# Patient Record
Sex: FEMALE | Race: WHITE | NOT HISPANIC OR LATINO | Employment: OTHER | URBAN - METROPOLITAN AREA
[De-identification: names, ages, dates, MRNs, and addresses within clinical notes are randomized per-mention and may not be internally consistent; named-entity substitution may affect disease eponyms.]

---

## 2017-03-31 ENCOUNTER — OFFICE VISIT (OUTPATIENT)
Dept: OBSTETRICS AND GYNECOLOGY | Facility: CLINIC | Age: 77
End: 2017-03-31
Payer: MEDICARE

## 2017-03-31 VITALS
HEIGHT: 68 IN | BODY MASS INDEX: 19.12 KG/M2 | DIASTOLIC BLOOD PRESSURE: 75 MMHG | HEART RATE: 71 BPM | WEIGHT: 126.13 LBS | SYSTOLIC BLOOD PRESSURE: 132 MMHG

## 2017-03-31 DIAGNOSIS — Z12.31 ENCOUNTER FOR SCREENING MAMMOGRAM FOR BREAST CANCER: ICD-10-CM

## 2017-03-31 DIAGNOSIS — Z01.419 WELL WOMAN EXAM WITH ROUTINE GYNECOLOGICAL EXAM: Primary | ICD-10-CM

## 2017-03-31 PROCEDURE — G0101 CA SCREEN;PELVIC/BREAST EXAM: HCPCS | Mod: S$GLB,,, | Performed by: OBSTETRICS & GYNECOLOGY

## 2017-03-31 PROCEDURE — 99999 PR PBB SHADOW E&M-EST. PATIENT-LVL III: CPT | Mod: PBBFAC,,, | Performed by: OBSTETRICS & GYNECOLOGY

## 2017-03-31 NOTE — PROGRESS NOTES
SUBJECTIVE:   76 y.o. female   for annual routine Pap and checkup. No LMP recorded. Patient is postmenopausal..  She has no unusual complaints.        Past Medical History:   Diagnosis Date    Anemia     Hypercholesteremia     Osteoporosis, postmenopausal     Poliomyelitis     as child    Vitamin D deficiency disease      Past Surgical History:   Procedure Laterality Date    IUD removed      TONSILLECTOMY      TONSILLECTOMY, ADENOIDECTOMY       Social History     Social History    Marital status: Single     Spouse name: N/A    Number of children: 2    Years of education: N/A     Occupational History    Not on file.     Social History Main Topics    Smoking status: Never Smoker    Smokeless tobacco: Never Used    Alcohol use No    Drug use: No    Sexual activity: Not on file     Other Topics Concern    Not on file     Social History Narrative    Exercises regularly.    Rates diet as fair.    Satisfied with weight.     Family History   Problem Relation Age of Onset    Stroke Mother     Heart failure Father     Rheum arthritis Father     Cancer Maternal Grandmother     Stroke Maternal Grandfather     Glaucoma Daughter     Stroke Maternal Aunt     Cancer Brother     Amblyopia Neg Hx     Blindness Neg Hx     Cataracts Neg Hx     Diabetes Neg Hx     Hypertension Neg Hx     Macular degeneration Neg Hx     Retinal detachment Neg Hx     Strabismus Neg Hx     Thyroid disease Neg Hx     Breast cancer Neg Hx     Ovarian cancer Neg Hx      OB History    Para Term  AB SAB TAB Ectopic Multiple Living   2 2 2 0 0 0 0 0 0 2      # Outcome Date GA Lbr Chong/2nd Weight Sex Delivery Anes PTL Lv   2 Term      Vag-Spont  N Y   1 Term      Vag-Spont  N Y      Obstetric Comments   Menarche age 17.         Current Outpatient Prescriptions   Medication Sig Dispense Refill    calcium-vitamin D (CALCIUM WITH VITAMIN D) 600 mg(1,500mg) -400 unit Tab Take 1 tablet by mouth once daily.      "   cyanocobalamin 2000 MCG tablet Take 2,000 mcg by mouth once daily.      ferrous sulfate 325 (65 FE) MG EC tablet Take 325 mg by mouth 3 (three) times daily with meals.      fish oil-omega-3 fatty acids 300-1,000 mg capsule Take 2 g by mouth once daily.      multivitamin capsule Take 1 capsule by mouth once daily.       No current facility-administered medications for this visit.      Allergies: Corticosteroids (glucocorticoids) and Prednisone     ROS:  Constitutional: no weight loss, weight gain, fever, fatigue  Eyes:  No vision changes, glasses/contacts  ENT/Mouth: No ulcers, sinus problems, ears ringing, headache  Cardiovascular: No inability to lie flat, chest pain, exercise intolerance, swelling, heart palpitations  Respiratory: No wheezing, coughing blood, shortness of breath, or cough  Gastrointestinal: No diarrhea, bloody stool, nausea/vomiting, constipation, gas, hemorrhoids  Genitourinary: No blood in urine, painful urination, urgency of urination, frequency of urination, incomplete emptying, incontinence, abnormal bleeding, painful periods, heavy periods, vaginal discharge, vaginal odor, painful intercourse, sexual problems, bleeding after intercourse.  Musculoskeletal: No muscle weakness  Skin/Breast: No painful breasts, nipple discharge, masses, rash, ulcers  Neurological: No passing out, seizures, numbness, headache  Endocrine: No diabetes, hypothyroid, hyperthyroid, hot flashes, hair loss, abnormal hair growth, ance  Psychiatric: No depression, crying  Hematologic: No bruises, bleeding, swollen lymph nodes, anemia.      OBJECTIVE:   The patient appears well, alert, oriented x 3, in no distress.  /75  Pulse 71  Ht 5' 8" (1.727 m)  Wt 57.2 kg (126 lb 1.7 oz)  BMI 19.17 kg/m2  NECK: no thyromegaly, trachea midline  SKIN: no acne, striae, hirsutism  BREAST EXAM: breasts appear normal, no suspicious masses, no skin or nipple changes or axillary nodes  ABDOMEN: no hernias, masses, or " hepatosplenomegaly  GENITALIA: normal external genitalia, no erythema, no discharge  URETHRA: normal urethra, normal urethral meatus  VAGINA: cystocele  CERVIX: no lesions or cervical motion tenderness  UTERUS: normal size, contour, position, consistency, mobility, non-tender  ADNEXA: normal adnexa and no mass, fullness, tenderness      ASSESSMENT:   well woman    PLAN:   mammogram  pap smear  counseled on breast self exam, mammography screening and adequate intake of calcium and vitamin D  return annually or prn

## 2017-04-03 ENCOUNTER — TELEPHONE (OUTPATIENT)
Dept: OBSTETRICS AND GYNECOLOGY | Facility: CLINIC | Age: 77
End: 2017-04-03

## 2017-04-03 NOTE — TELEPHONE ENCOUNTER
----- Message from Mayra Garsia sent at 3/31/2017  4:43 PM CDT -----  Contact: self  Patient called regarding scheduling an appt for f/u from today. Not sure what the appt is for and advise someone at the office she would call back to schedule once she is at home by her calendar. Please contact [phone] (home)

## 2017-04-05 NOTE — TELEPHONE ENCOUNTER
Could not reach by phone, patient had scheduled another appointment to get a pap, when she was told that she did not need a pap after age 65.

## 2017-05-01 ENCOUNTER — TELEPHONE (OUTPATIENT)
Dept: OBSTETRICS AND GYNECOLOGY | Facility: CLINIC | Age: 77
End: 2017-05-01

## 2017-05-01 NOTE — TELEPHONE ENCOUNTER
----- Message from Yoon Valdes sent at 4/29/2017  2:46 PM CDT -----  Contact: pt  Pt called and states she wants to reschedule her appt to another day and time due to her daughter is here from out of town. Pt would like for someone to call her in regards to rescheduling her appt. Pt can be reached at [phone].pt would like to come in before may 10,2017 due to she is having eye surgery on may 10,2017.

## 2017-05-01 NOTE — TELEPHONE ENCOUNTER
Called and spoke to pt and informed her that her chances for  Cervical  Cancer is greatly reduced that it is not required to get a pap after age 65 years. Pt is insisting on getting the pap, rescheduled appt

## 2017-06-27 ENCOUNTER — OFFICE VISIT (OUTPATIENT)
Dept: OBSTETRICS AND GYNECOLOGY | Facility: CLINIC | Age: 77
End: 2017-06-27
Payer: MEDICARE

## 2017-06-27 VITALS
SYSTOLIC BLOOD PRESSURE: 156 MMHG | WEIGHT: 123.56 LBS | HEART RATE: 64 BPM | BODY MASS INDEX: 18.73 KG/M2 | DIASTOLIC BLOOD PRESSURE: 83 MMHG | HEIGHT: 68 IN

## 2017-06-27 DIAGNOSIS — Z01.419 WELL WOMAN EXAM WITH ROUTINE GYNECOLOGICAL EXAM: Primary | ICD-10-CM

## 2017-06-27 PROCEDURE — 99999 PR PBB SHADOW E&M-EST. PATIENT-LVL III: CPT | Mod: PBBFAC,,, | Performed by: OBSTETRICS & GYNECOLOGY

## 2017-06-27 PROCEDURE — G0101 CA SCREEN;PELVIC/BREAST EXAM: HCPCS | Mod: S$GLB,,, | Performed by: OBSTETRICS & GYNECOLOGY

## 2017-06-27 PROCEDURE — 88142 CYTOPATH C/V THIN LAYER: CPT

## 2017-07-09 NOTE — PROGRESS NOTES
Subjective:       Patient ID: Joan Josue is a 76 y.o. female.    Chief Complaint:  No chief complaint on file.      History of Present Illness  HPI  Pt without complaints, here for pap.  Pt has been counselled on the fact she does not need a pap after the age of 66 but still desires one.    GYN & OB History  No LMP recorded. Patient is postmenopausal.   Date of Last Pap: 2017    OB History    Para Term  AB Living   2 2 2 0 0 2   SAB TAB Ectopic Multiple Live Births   0 0 0 0 2      # Outcome Date GA Lbr Chong/2nd Weight Sex Delivery Anes PTL Lv   2 Term      Vag-Spont  N CIRO   1 Term      Vag-Spont  N CIRO      Obstetric Comments   Menarche age 17.       Review of Systems  Review of Systems   Constitutional: Negative.    Respiratory: Negative.    Cardiovascular: Negative.    Gastrointestinal: Negative.    Genitourinary: Negative.    Musculoskeletal: Negative.    Skin:  Negative.   Neurological: Negative.    Psychiatric/Behavioral: Negative.            Objective:    Physical Exam:   Constitutional: She is oriented to person, place, and time. She appears well-developed and well-nourished.    HENT:   Head: Normocephalic and atraumatic.    Eyes: EOM are normal.    Neck: Normal range of motion.    Cardiovascular: Normal rate.     Pulmonary/Chest: Effort normal.          Genitourinary: Vagina normal. There is no rash, tenderness, lesion or injury on the right labia. There is no rash, tenderness, lesion or injury on the left labia. Cervix is normal.           Musculoskeletal: Normal range of motion and moves all extremeties.       Neurological: She is alert and oriented to person, place, and time.    Skin: Skin is warm and dry.    Psychiatric: She has a normal mood and affect. Her behavior is normal. Judgment and thought content normal.          Assessment:        1. Well woman exam with routine gynecological exam                Plan:      Pap done at patient request

## 2018-02-19 PROBLEM — D51.9 VITAMIN B12 DEFICIENCY ANEMIA: Status: ACTIVE | Noted: 2018-02-19

## 2018-05-14 ENCOUNTER — OFFICE VISIT (OUTPATIENT)
Dept: HEMATOLOGY/ONCOLOGY | Facility: CLINIC | Age: 78
End: 2018-05-14
Payer: MEDICARE

## 2018-05-14 VITALS
TEMPERATURE: 99 F | SYSTOLIC BLOOD PRESSURE: 150 MMHG | DIASTOLIC BLOOD PRESSURE: 88 MMHG | BODY MASS INDEX: 19.37 KG/M2 | HEART RATE: 80 BPM | RESPIRATION RATE: 18 BRPM | WEIGHT: 127.38 LBS

## 2018-05-14 DIAGNOSIS — D64.89 ANEMIA DUE TO MULTIPLE MECHANISMS: ICD-10-CM

## 2018-05-14 DIAGNOSIS — D64.9 ANEMIA, UNSPECIFIED TYPE: ICD-10-CM

## 2018-05-14 DIAGNOSIS — D51.8 OTHER VITAMIN B12 DEFICIENCY ANEMIA: Primary | ICD-10-CM

## 2018-05-14 DIAGNOSIS — E55.9 VITAMIN D DEFICIENCY DISEASE: ICD-10-CM

## 2018-05-14 DIAGNOSIS — D50.8 OTHER IRON DEFICIENCY ANEMIA: ICD-10-CM

## 2018-05-14 PROBLEM — D50.9 IRON DEFICIENCY ANEMIA: Status: ACTIVE | Noted: 2018-05-14

## 2018-05-14 PROCEDURE — 99213 OFFICE O/P EST LOW 20 MIN: CPT | Mod: ,,, | Performed by: INTERNAL MEDICINE

## 2018-05-14 NOTE — LETTER
May 14, 2018      Donald Andre MD  [address] [place] [address] [address]  [facility] [address]           [facility]ology [facility]  [address]  [address]  [facility] [address]  Phone: [phone]  Fax: [phone]          Patient: Joan Josue   MR Number: [id number]   YOB: 1940   Date of Visit: 5/14/2018       Dear Dr. Donald Andre:    Thank you for referring Joan Josue to me for evaluation. Attached you will find relevant portions of my assessment and plan of care.    If you have questions, please do not hesitate to call me. I look forward to following Joan Josue along with you.    Sincerely,    Peter Paula MD    Enclosure  CC:  No Recipients    If you would like to receive this communication electronically, please contact [email or web address] or [phone] to request more information on [employer] access.    For providers and/or their staff who would like to refer a patient to [facility], please contact us through our one-stop-shop provider referral line, [facility], at [phone].    If you feel you have received this communication in error or would no longer like to receive these types of communications, please e-mail [email or web address]

## 2018-05-14 NOTE — PROGRESS NOTES
"   [facility] Hematology/Oncology  PROGRESS NOTE      Subjective:       Patient ID:   NAME: Joan Josue : 1940     77 y.o. female    Referring Doc: Thai  Other Physicians:    Chief Complaint:  Anemia f/u    History of Present Illness:     Patient returns today for a regularly scheduled follow-up visit.  The patient was last seen on 2016. She is here by herself. She is doing ok with no new issues. She denies any CP, SOB, HA's or N/V. She has been noncompliant with the B12 supplements. She says she has been taking "natural" iron supplements. She feels "good"; she plans to move to [place] soon.             ROS:   GEN: normal without any fever, night sweats or weight loss  HEENT: normal with no HA's, sore throat, stiff neck, changes in vision  CV: normal with no CP, SOB, PND, MACKEY or orthopnea  PULM: normal with no SOB, cough, hemoptysis, sputum or pleuritic pain  GI: normal with no abdominal pain, nausea, vomiting, constipation, diarrhea, melanotic stools, BRBPR, or hematemesis  : normal with no hematuria, dysuria  BREAST: normal with no mass, discharge, pain  SKIN: normal with no rash, erythema, bruising, or swelling    Allergies:  Review of patient's allergies indicates:   Allergen Reactions    Corticosteroids (glucocorticoids)      Tachycardia      Prednisone      Other reaction(s): tachycardia  Other reaction(s): muscle spasm       Medications:    Current Outpatient Prescriptions:     calcium-vitamin D (CALCIUM WITH VITAMIN D) 600 mg(1,500mg) -400 unit Tab, Take 1 tablet by mouth once daily.  , Disp: , Rfl:     cyanocobalamin 2000 MCG tablet, Take 2,000 mcg by mouth once daily., Disp: , Rfl:     ferrous sulfate 325 (65 FE) MG EC tablet, Take 325 mg by mouth 3 (three) times daily with meals., Disp: , Rfl:     fish oil-omega-3 fatty acids 300-1,000 mg capsule, Take 2 g by mouth once daily., Disp: , Rfl:     multivitamin capsule, Take 1 capsule by mouth once daily., Disp: , Rfl:     PMHx/PSHx " Updates:  See patient's last visit with me on 5/30/2106.  See H&P on 5/22/2014        Pathology:  none          Objective:     Vitals:  Blood pressure (!) 150/88, pulse 80, temperature 98.5 °F (36.9 °C), resp. rate 18, weight 57.8 kg (127 lb 6.4 oz).    Physical Examination:   GEN: no apparent distress, comfortable; AAOx3  HEAD: atraumatic and normocephalic  EYES: no pallor, no icterus, PERRLA  ENT: OMM, no pharyngeal erythema, external ears WNL; no nasal discharge; no thrush  NECK: no masses, thyroid normal, trachea midline, no LAD/LN's, supple  CV: RRR with no murmur; normal pulse; normal S1 and S2; no pedal edema  CHEST: Normal respiratory effort; CTAB; normal breath sounds; no wheeze or crackles  ABDOM: nontender and nondistended; soft; normal bowel sounds; no rebound/guarding  MUSC/Skeletal: ROM normal; no crepitus; joints normal; no deformities or arthropathy  EXTREM: no clubbing, cyanosis, inflammation or swelling  SKIN: no rashes, lesions, ulcers, petechiae or subcutaneous nodules  : no meneses  NEURO: grossly intact; motor/sensory WNL; AAOx3; no tremors  PSYCH: normal mood, affect and behavior  LYMPH: normal cervical, supraclavicular, axillary and groin LN's            Labs:     4/23/2018  Lab Results   Component Value Date    WBC 5.90 04/23/2015    HGB 11.3 (L) 04/23/2015    HCT 33.7 (L) 04/23/2015    MCV 98 04/23/2015     04/23/2015     BMP  Lab Results   Component Value Date     (L) 04/23/2015    K 3.6 04/23/2015     04/23/2015    CO2 25 04/23/2015    BUN 9 04/23/2015    CREATININE 0.6 04/23/2015    CALCIUM 9.5 04/23/2015    ANIONGAP 8 04/23/2015    ESTGFRAFRICA >60 04/23/2015    EGFRNONAA >60 04/23/2015     Lab Results   Component Value Date    ALT 13 04/23/2015    AST 19 04/23/2015    ALKPHOS 72 04/23/2015    BILITOT 0.3 04/23/2015           Radiology/Diagnostic Studies:    No results found.    I have reviewed all available lab results and radiology reports.    Assessment/Plan:   (1)  77 y.o. female with diagnosis of multifactorial anemia with underlying anemia of iron deficiency and B12 deficiency  - s/p IV iron in past (Aug-Sept 2015)  - previously on B12 monthly  - latest hgb at 11.3  - no up to date B12 or iron labs       (2) Persistent noncompliance issues which hinders my ability to provide her with hematologic care        PLAN:  1. Check up to date labs incl. Iron and B12  2. Encouraged compliance  3. F/u with PCP  4. RTC in 6 months    Fax note to Donald Andre MD,     Discussion:     I have explained all of the above in detail and the patient understands all of the current recommendation(s). I have answered all of their questions to the best of my ability and to their complete satisfaction.   The patient is to continue with the current management plan.            Electronically signed by Peter Paula MD

## 2018-05-15 ENCOUNTER — OFFICE VISIT (OUTPATIENT)
Dept: UROGYNECOLOGY | Facility: CLINIC | Age: 78
End: 2018-05-15
Payer: MEDICARE

## 2018-05-15 VITALS
SYSTOLIC BLOOD PRESSURE: 136 MMHG | BODY MASS INDEX: 19.12 KG/M2 | DIASTOLIC BLOOD PRESSURE: 74 MMHG | HEART RATE: 68 BPM | HEIGHT: 68 IN | WEIGHT: 126.13 LBS

## 2018-05-15 DIAGNOSIS — K46.9 ENTEROCELE: ICD-10-CM

## 2018-05-15 DIAGNOSIS — N81.11 CYSTOCELE, MIDLINE: Primary | ICD-10-CM

## 2018-05-15 DIAGNOSIS — N39.8 VOIDING DYSFUNCTION: ICD-10-CM

## 2018-05-15 DIAGNOSIS — N81.89 PELVIC RELAXATION: ICD-10-CM

## 2018-05-15 DIAGNOSIS — N95.2 ATROPHIC VAGINITIS: ICD-10-CM

## 2018-05-15 PROCEDURE — 99214 OFFICE O/P EST MOD 30 MIN: CPT | Mod: 25,S$GLB,, | Performed by: NURSE PRACTITIONER

## 2018-05-15 PROCEDURE — 57160 INSERT PESSARY/OTHER DEVICE: CPT | Mod: S$GLB,,, | Performed by: NURSE PRACTITIONER

## 2018-05-15 PROCEDURE — 99999 PR PBB SHADOW E&M-EST. PATIENT-LVL III: CPT | Mod: PBBFAC,,, | Performed by: NURSE PRACTITIONER

## 2018-05-15 RX ORDER — ESTRADIOL 0.1 MG/G
CREAM VAGINAL
Qty: 42.5 G | Refills: 3 | Status: SHIPPED | OUTPATIENT
Start: 2018-05-15

## 2018-05-15 NOTE — PROGRESS NOTES
Subjective:       Patient ID: Joan Josue is a 77 y.o. female.    Chief Complaint: Vaginal Prolapse    HPI  Joan Josue is a 77 y.o. female who presents today for worsening prolapse.  She was seen last on 06/17/16 and had been doing well with her exercises at home and did not wish to pursue a pessary or surgical intervention.  Then in Jan 2018 she noticed that her prolapse was more pronounced.  It has come to a point were the vaginal bulge is starting to be bothersome.  She would like to try a pessary to see if this helps.  She does not wish to have any surgical intervention at this time.  She denies any urinary frequency or urgency.  She at times has difficulty voiding with the prolapse, but this is not overly bothersome for her.  She denies any vaginal discharge or bleeding.  She is up to date with her WWE.  She is very anxious to try the pessary.    Review of Systems   Constitutional: Negative for activity change, fever and unexpected weight change.   HENT: Negative for hearing loss.    Eyes: Negative for visual disturbance.   Respiratory: Negative for shortness of breath and wheezing.    Cardiovascular: Negative for chest pain, palpitations and leg swelling.   Gastrointestinal: Negative for abdominal pain, constipation and diarrhea.   Genitourinary: Negative for dyspareunia, dysuria, frequency, urgency, vaginal bleeding and vaginal discharge.        Vaginal bulge   Musculoskeletal: Negative for gait problem and neck pain.   Skin: Negative for rash and wound.   Allergic/Immunologic: Negative for immunocompromised state.   Neurological: Negative for tremors, speech difficulty and weakness.   Hematological: Does not bruise/bleed easily.   Psychiatric/Behavioral: Negative for agitation and confusion.       Objective:      Physical Exam   Constitutional: She is oriented to person, place, and time. She appears well-developed and well-nourished. No distress.   HENT:   Head: Normocephalic and atraumatic.    Neck: Neck supple. No thyromegaly present.   Pulmonary/Chest: Effort normal. No respiratory distress.   Abdominal: Soft. There is no tenderness. No hernia.   Musculoskeletal: Normal range of motion.   Neurological: She is alert and oriented to person, place, and time.   Skin: Skin is warm and dry. No rash noted.   Psychiatric: She has a normal mood and affect. Her behavior is normal.     Pelvic Exam:  V: No lesions. No palpable nodes.   Va: no discharge or bleeding.  Dominant midline cystocele Ba=-1, enterocele Be=-3, mild uterine descent noted with elevation of bladder =-3  Meatus:No caruncle or stenosis  Urethra: Non tender. No suburethral masses.  Cx/Cuff: Normal   Uterus: mild uterine descent with elevation of bladder.  Ad: No mass or tenderness.  Levators :Symmetrical. Normal tone. Non tender.  BL: Non tender  RV: No hemorrhoids.      Assessment:       1. Cystocele, midline    2. Enterocele    3. Pelvic relaxation    4. Atrophic vaginitis    5. Voiding dysfunction          Procedure note- After betadine irrigation of the vagina a #3 sizer ring pessary was inserted into the vagina.  The pt did various exercises and voided and felt that there was some discomfort on the right side with the pessary.  The #3 sizer ring pessary was removed and a #2 sizer ring pessary was placed.  Pt again did various exercises in the room and was able to retain the pessary with no discomfort.   #2 sizer ring  pessary removed and cleaned with soap and water and sent to sterilization.  #2 permanent ring pessary was inserted into the vagina.  Pt ambulated in the room and denies any discomfort.  NP reminded pt that the first 24-48 hours are the most likely time for the pessary to fall out and to monitor the toilet before flushing.  Pt verbalized understanding.      Plan:       Cystocele, midline- trial of #2 ring pessary    Enterocele- trial of #2 ring pessary    Pelvic relaxation- trial of #2 ring pessary    Atrophic vaginitis- resume  estrace cream as directed    Voiding dysfunction- monitor    RTC 1 month

## 2018-05-23 ENCOUNTER — TELEPHONE (OUTPATIENT)
Dept: UROGYNECOLOGY | Facility: CLINIC | Age: 78
End: 2018-05-23

## 2018-05-23 NOTE — TELEPHONE ENCOUNTER
----- Message from Parris Arteaga sent at 5/23/2018  3:55 PM CDT -----  Call [phone] ... Asking to speak to nurse about her last appointment

## 2018-05-28 NOTE — TELEPHONE ENCOUNTER
Spoke to pt and she is going to be out of town 6/19 will have to call back and reschedule appt at a later date. Will call back when she gets in town.

## 2018-06-20 LAB
ALBUMIN SERPL-MCNC: 4.6 G/DL (ref 3.6–5.1)
ALBUMIN/GLOB SERPL: 2.3 (CALC) (ref 1–2.5)
ALP SERPL-CCNC: 69 U/L (ref 33–130)
ALT SERPL-CCNC: 13 U/L (ref 6–29)
AST SERPL-CCNC: 18 U/L (ref 10–35)
BASOPHILS # BLD AUTO: 81 CELLS/UL (ref 0–200)
BASOPHILS NFR BLD AUTO: 1.8 %
BILIRUB SERPL-MCNC: 0.5 MG/DL (ref 0.2–1.2)
BUN SERPL-MCNC: 7 MG/DL (ref 7–25)
BUN/CREAT SERPL: 13 (CALC) (ref 6–22)
CALCIUM SERPL-MCNC: 9.8 MG/DL (ref 8.6–10.4)
CHLORIDE SERPL-SCNC: 99 MMOL/L (ref 98–110)
CO2 SERPL-SCNC: 28 MMOL/L (ref 20–31)
CREAT SERPL-MCNC: 0.56 MG/DL (ref 0.6–0.93)
EOSINOPHIL # BLD AUTO: 162 CELLS/UL (ref 15–500)
EOSINOPHIL NFR BLD AUTO: 3.6 %
ERYTHROCYTE [DISTWIDTH] IN BLOOD BY AUTOMATED COUNT: 11.9 % (ref 11–15)
FERRITIN SERPL-MCNC: 8 NG/ML (ref 20–288)
FOLATE SERPL-MCNC: 18 NG/ML
GFR SERPL CREATININE-BSD FRML MDRD: 91 ML/MIN/1.73M2
GLOBULIN SER CALC-MCNC: 2 G/DL (CALC) (ref 1.9–3.7)
GLUCOSE SERPL-MCNC: 95 MG/DL (ref 65–99)
HCT VFR BLD AUTO: 30.7 % (ref 35–45)
HGB BLD-MCNC: 10.1 G/DL (ref 11.7–15.5)
IRON SATN MFR SERPL: 23 % (CALC) (ref 11–50)
IRON SERPL-MCNC: 82 MCG/DL (ref 45–160)
LYMPHOCYTES # BLD AUTO: 995 CELLS/UL (ref 850–3900)
LYMPHOCYTES NFR BLD AUTO: 22.1 %
MCH RBC QN AUTO: 31.1 PG (ref 27–33)
MCHC RBC AUTO-ENTMCNC: 32.9 G/DL (ref 32–36)
MCV RBC AUTO: 94.5 FL (ref 80–100)
MONOCYTES # BLD AUTO: 351 CELLS/UL (ref 200–950)
MONOCYTES NFR BLD AUTO: 7.8 %
NEUTROPHILS # BLD AUTO: 2912 CELLS/UL (ref 1500–7800)
NEUTROPHILS NFR BLD AUTO: 64.7 %
PLATELET # BLD AUTO: 338 THOUSAND/UL (ref 140–400)
PMV BLD REES-ECKER: 9.9 FL (ref 7.5–12.5)
POTASSIUM SERPL-SCNC: 4.4 MMOL/L (ref 3.5–5.3)
PROT SERPL-MCNC: 6.6 G/DL (ref 6.1–8.1)
RBC # BLD AUTO: 3.25 MILLION/UL (ref 3.8–5.1)
SODIUM SERPL-SCNC: 135 MMOL/L (ref 135–146)
TIBC SERPL-MCNC: 357 MCG/DL (CALC) (ref 250–450)
VIT B12 SERPL-MCNC: 485 PG/ML (ref 200–1100)
WBC # BLD AUTO: 4.5 THOUSAND/UL (ref 3.8–10.8)

## 2018-06-22 ENCOUNTER — TELEPHONE (OUTPATIENT)
Dept: HEMATOLOGY/ONCOLOGY | Facility: CLINIC | Age: 78
End: 2018-06-22

## 2018-06-22 NOTE — TELEPHONE ENCOUNTER
----- Message from Peter Paula MD sent at 6/21/2018  8:27 AM CDT -----  Is she taking oral iron? If so, would she want to consider a round of IV iron?

## 2018-06-22 NOTE — TELEPHONE ENCOUNTER
Called patient left message asking if taking oral iron if she is would she be willing to do IV iron and if not taking oral iron can we call in a iron pill prescription

## 2018-07-12 ENCOUNTER — TELEPHONE (OUTPATIENT)
Dept: HEMATOLOGY/ONCOLOGY | Facility: CLINIC | Age: 78
End: 2018-07-12

## 2018-07-12 NOTE — TELEPHONE ENCOUNTER
Called pt asked if she wanted iron infusion   She said yes  And will schedule when sami gets back

## 2018-07-20 ENCOUNTER — TELEPHONE (OUTPATIENT)
Dept: HEMATOLOGY/ONCOLOGY | Facility: CLINIC | Age: 78
End: 2018-07-20

## 2018-07-20 NOTE — TELEPHONE ENCOUNTER
Talked to patient she will have to call me back next week to see when she will be able to do infusions

## 2018-07-23 ENCOUNTER — TELEPHONE (OUTPATIENT)
Dept: HEMATOLOGY/ONCOLOGY | Facility: CLINIC | Age: 78
End: 2018-07-23

## 2018-07-23 NOTE — TELEPHONE ENCOUNTER
Called nasim to call patient to schedule injectafer infusions         ----- Message from Samantha Kimbrough sent at 7/20/2018  3:40 PM CDT -----  The patient called and stated that you had called her about setting up iron infusions. She was unable to talk to you when you called and she would like for you to call her back on Monday. She said to call her cell phone [phone] and if no answer call [phone]. Thanks

## 2018-11-12 ENCOUNTER — OFFICE VISIT (OUTPATIENT)
Dept: HEMATOLOGY/ONCOLOGY | Facility: CLINIC | Age: 78
End: 2018-11-12
Payer: MEDICARE

## 2018-11-12 VITALS
RESPIRATION RATE: 20 BRPM | WEIGHT: 124.19 LBS | TEMPERATURE: 98 F | HEART RATE: 67 BPM | BODY MASS INDEX: 18.88 KG/M2 | DIASTOLIC BLOOD PRESSURE: 62 MMHG | SYSTOLIC BLOOD PRESSURE: 139 MMHG

## 2018-11-12 DIAGNOSIS — D51.8 OTHER VITAMIN B12 DEFICIENCY ANEMIA: Primary | ICD-10-CM

## 2018-11-12 DIAGNOSIS — D64.89 ANEMIA DUE TO MULTIPLE MECHANISMS: ICD-10-CM

## 2018-11-12 DIAGNOSIS — D50.8 OTHER IRON DEFICIENCY ANEMIA: ICD-10-CM

## 2018-11-12 DIAGNOSIS — D64.9 ANEMIA, UNSPECIFIED TYPE: ICD-10-CM

## 2018-11-12 LAB
ALBUMIN SERPL-MCNC: 4.4 G/DL (ref 3.6–5.1)
ALBUMIN/GLOB SERPL: 2.3 (CALC) (ref 1–2.5)
ALP SERPL-CCNC: 81 U/L (ref 33–130)
ALT SERPL-CCNC: 13 U/L (ref 6–29)
AST SERPL-CCNC: 18 U/L (ref 10–35)
BASOPHILS # BLD AUTO: 79 CELLS/UL (ref 0–200)
BASOPHILS NFR BLD AUTO: 1.8 %
BILIRUB SERPL-MCNC: 0.3 MG/DL (ref 0.2–1.2)
BUN SERPL-MCNC: 7 MG/DL (ref 7–25)
BUN/CREAT SERPL: 12 (CALC) (ref 6–22)
CALCIUM SERPL-MCNC: 9.4 MG/DL (ref 8.6–10.4)
CHLORIDE SERPL-SCNC: 98 MMOL/L (ref 98–110)
CO2 SERPL-SCNC: 30 MMOL/L (ref 20–32)
CREAT SERPL-MCNC: 0.57 MG/DL (ref 0.6–0.93)
EOSINOPHIL # BLD AUTO: 233 CELLS/UL (ref 15–500)
EOSINOPHIL NFR BLD AUTO: 5.3 %
ERYTHROCYTE [DISTWIDTH] IN BLOOD BY AUTOMATED COUNT: 14.3 % (ref 11–15)
FERRITIN SERPL-MCNC: 7 NG/ML (ref 20–288)
FOLATE SERPL-MCNC: 9.8 NG/ML
GFR SERPL CREATININE-BSD FRML MDRD: 89 ML/MIN/1.73M2
GLOBULIN SER CALC-MCNC: 1.9 G/DL (CALC) (ref 1.9–3.7)
GLUCOSE SERPL-MCNC: 100 MG/DL (ref 65–99)
HCT VFR BLD AUTO: 28.9 % (ref 35–45)
HGB BLD-MCNC: 9 G/DL (ref 11.7–15.5)
IRON SATN MFR SERPL: 7 % (CALC) (ref 11–50)
IRON SERPL-MCNC: 25 MCG/DL (ref 45–160)
LYMPHOCYTES # BLD AUTO: 1566 CELLS/UL (ref 850–3900)
LYMPHOCYTES NFR BLD AUTO: 35.6 %
MCH RBC QN AUTO: 28.6 PG (ref 27–33)
MCHC RBC AUTO-ENTMCNC: 31.1 G/DL (ref 32–36)
MCV RBC AUTO: 91.7 FL (ref 80–100)
MONOCYTES # BLD AUTO: 405 CELLS/UL (ref 200–950)
MONOCYTES NFR BLD AUTO: 9.2 %
NEUTROPHILS # BLD AUTO: 2116 CELLS/UL (ref 1500–7800)
NEUTROPHILS NFR BLD AUTO: 48.1 %
PLATELET # BLD AUTO: 329 THOUSAND/UL (ref 140–400)
PMV BLD REES-ECKER: 10.7 FL (ref 7.5–12.5)
POTASSIUM SERPL-SCNC: 4.4 MMOL/L (ref 3.5–5.3)
PROT SERPL-MCNC: 6.3 G/DL (ref 6.1–8.1)
RBC # BLD AUTO: 3.15 MILLION/UL (ref 3.8–5.1)
SODIUM SERPL-SCNC: 133 MMOL/L (ref 135–146)
TIBC SERPL-MCNC: 372 MCG/DL (CALC) (ref 250–450)
VIT B12 SERPL-MCNC: 835 PG/ML (ref 200–1100)
WBC # BLD AUTO: 4.4 THOUSAND/UL (ref 3.8–10.8)

## 2018-11-12 PROCEDURE — 99213 OFFICE O/P EST LOW 20 MIN: CPT | Mod: 25,,, | Performed by: INTERNAL MEDICINE

## 2018-11-12 PROCEDURE — 1101F PT FALLS ASSESS-DOCD LE1/YR: CPT | Mod: ,,, | Performed by: INTERNAL MEDICINE

## 2018-11-12 PROCEDURE — 96372 THER/PROPH/DIAG INJ SC/IM: CPT | Mod: ,,, | Performed by: INTERNAL MEDICINE

## 2018-11-12 RX ORDER — IRON,CARBONYL/ASCORBIC ACID 100-250 MG
1 TABLET ORAL DAILY
Refills: 6 | COMMUNITY
Start: 2018-11-12 | End: 2018-12-12

## 2018-11-12 RX ORDER — CYANOCOBALAMIN 1000 UG/ML
1000 INJECTION, SOLUTION INTRAMUSCULAR; SUBCUTANEOUS
Status: SHIPPED | OUTPATIENT
Start: 2018-11-12 | End: 2019-11-07

## 2018-11-12 RX ADMIN — CYANOCOBALAMIN 1000 MCG: 1000 INJECTION, SOLUTION INTRAMUSCULAR; SUBCUTANEOUS at 09:11

## 2018-11-12 NOTE — PROGRESS NOTES
"   [facility]ology/On[facility]  PROGRESS NOTE      Subjective:       Patient ID:   NAME: Joan Josue : 1940     78 y.o. female    Referring Doc: Thai  Other Physicians:    Chief Complaint:  Anemia f/u    History of Present Illness:     Patient returns today for a regularly scheduled follow-up visit.   She is here by herself. She is doing ok with no new issues. She denies any CP, SOB, HA's or N/V. She has been noncompliant with the B12 supplements. She previously says she has been taking "natural" iron supplements. She feels "good"; she was supposed to move to [place], but has not done so as of yet          ROS:   GEN: normal without any fever, night sweats or weight loss  HEENT: normal with no HA's, sore throat, stiff neck, changes in vision  CV: normal with no CP, SOB, PND, MACKEY or orthopnea  PULM: normal with no SOB, cough, hemoptysis, sputum or pleuritic pain  GI: normal with no abdominal pain, nausea, vomiting, constipation, diarrhea, melanotic stools, BRBPR, or hematemesis  : normal with no hematuria, dysuria  BREAST: normal with no mass, discharge, pain  SKIN: normal with no rash, erythema, bruising, or swelling    Allergies:  Review of patient's allergies indicates:   Allergen Reactions    Corticosteroids (glucocorticoids)      Tachycardia      Prednisone      Other reaction(s): tachycardia  Other reaction(s): muscle spasm       Medications:    Current Outpatient Medications:     calcium-vitamin D (CALCIUM WITH VITAMIN D) 600 mg(1,500mg) -400 unit Tab, Take 1 tablet by mouth once daily.  , Disp: , Rfl:     cyanocobalamin 2000 MCG tablet, Take 2,000 mcg by mouth once daily., Disp: , Rfl:     estradiol (ESTRACE) 0.01 % (0.1 mg/gram) vaginal cream, Apply 1 fingertipful to vaginal area nightly x 2 weeks then every other night x 2 weeks then start using two times a week, Disp: 42.5 g, Rfl: 3    ferrous sulfate 325 (65 FE) MG EC tablet, Take 325 mg by mouth 3 (three) times daily with meals., " Disp: , Rfl:     fish oil-omega-3 fatty acids 300-1,000 mg capsule, Take 2 g by mouth once daily., Disp: , Rfl:     multivitamin capsule, Take 1 capsule by mouth once daily., Disp: , Rfl:     PMHx/PSHx Updates:  See patient's last visit with me on 5/14/2018  See H&P on 5/22/2014        Pathology:  none          Objective:     Vitals:  Blood pressure 139/62, pulse 67, temperature 98 °F (36.7 °C), resp. rate 20, weight 56.3 kg (124 lb 3.2 oz).    Physical Examination:   GEN: no apparent distress, comfortable; AAOx3  HEAD: atraumatic and normocephalic  EYES: no pallor, no icterus, PERRLA  ENT: OMM, no pharyngeal erythema, external ears WNL; no nasal discharge; no thrush  NECK: no masses, thyroid normal, trachea midline, no LAD/LN's, supple  CV: RRR with no murmur; normal pulse; normal S1 and S2; no pedal edema  CHEST: Normal respiratory effort; CTAB; normal breath sounds; no wheeze or crackles  ABDOM: nontender and nondistended; soft; normal bowel sounds; no rebound/guarding  MUSC/Skeletal: ROM normal; no crepitus; joints normal; no deformities or arthropathy  EXTREM: no clubbing, cyanosis, inflammation or swelling  SKIN: no rashes, lesions, ulcers, petechiae or subcutaneous nodules  : no meneses  NEURO: grossly intact; motor/sensory WNL; AAOx3; no tremors  PSYCH: normal mood, affect and behavior  LYMPH: normal cervical, supraclavicular, axillary and groin LN's            Labs:     6/19/2018  Lab Results   Component Value Date    WBC 4.5 06/19/2018    HGB 10.1 (L) 06/19/2018    HCT 30.7 (L) 06/19/2018    MCV 94.5 06/19/2018     06/19/2018     BMP  Lab Results   Component Value Date     06/19/2018    K 4.4 06/19/2018    CL 99 06/19/2018    CO2 28 06/19/2018    BUN 7 06/19/2018    CREATININE 0.56 (L) 06/19/2018    CALCIUM 9.8 06/19/2018    ANIONGAP 8 04/23/2015    ESTGFRAFRICA 105 06/19/2018    EGFRNONAA 91 06/19/2018     Lab Results   Component Value Date    ALT 13 06/19/2018    AST 18 06/19/2018     ALKPHOS 69 06/19/2018    BILITOT 0.5 06/19/2018     Lab Results   Component Value Date    TVVXXMHF78 485 06/19/2018     Lab Results   Component Value Date    FOLATE 18.0 06/19/2018       Lab Results   Component Value Date    IRON 82 06/19/2018    TIBC 357 06/19/2018    FERRITIN 8 (L) 06/19/2018         Radiology/Diagnostic Studies:    No results found.    I have reviewed all available lab results and radiology reports.    Assessment/Plan:   (1) 78 y.o. female with diagnosis of multifactorial anemia with underlying anemia of iron deficiency and B12 deficiency  - s/p IV iron in past (Aug-Sept 2015)  - previously on B12 monthly  - latest hgb at 11.1 in June 2018  - latest B12 and folate adequate  - ferritin low at 8      (2) Persistent noncompliance issues which hinders my ability to provide her with hematologic care      1. Other vitamin B12 deficiency anemia     2. Other iron deficiency anemia     3. Anemia due to multiple mechanisms     4. Anemia, unspecified type             PLAN:  1. Check up to date labs incl. Iron and B12 - due next month  2. Encouraged compliance  3. F/u with PCP  4. Add ICAR-C po daily and resume B12 shots  5. RTC in 6 months    Fax note to Donald Andre MD,     Discussion:     I have explained all of the above in detail and the patient understands all of the current recommendation(s). I have answered all of their questions to the best of my ability and to their complete satisfaction.   The patient is to continue with the current management plan.            Electronically signed by Peter Paula MD

## 2018-11-12 NOTE — LETTER
November 12, 2018      Donald Andre MD  [address] [place] [address] [address]  [place] [address]           [facility] - Hematology Oncology  [address] [facility]  [address]  [place] [address]  Phone: [phone]  Fax: [phone]          Patient: Joan Josue   MR Number: [id number]   YOB: 1940   Date of Visit: 11/12/2018       Dear Dr. Donald Andre:    Thank you for referring Joan Josue to me for evaluation. Attached you will find relevant portions of my assessment and plan of care.    If you have questions, please do not hesitate to call me. I look forward to following Joan Josue along with you.    Sincerely,    Peter Paula MD    Enclosure  CC:  No Recipients    If you would like to receive this communication electronically, please contact [email or web address] or [phone] to request more information on [facility] access.    For providers and/or their staff who would like to refer a patient to [facility], please contact us through our one-stop-shop provider referral line, [facility], at [phone].    If you feel you have received this communication in error or would no longer like to receive these types of communications, please e-mail [email or web address]

## 2019-02-07 ENCOUNTER — TELEPHONE (OUTPATIENT)
Dept: HEMATOLOGY/ONCOLOGY | Facility: CLINIC | Age: 79
End: 2019-02-07

## 2019-02-07 NOTE — TELEPHONE ENCOUNTER
Called to see if the pt had any labs done prior to f/u appt.     Pt is in the process of moving to [place] and would like to post pone a f/u as she is back an forward at this time.  She would like a reminder call in about 2 1/2 wks to try and set up a f/u.

## 2019-04-22 ENCOUNTER — TELEPHONE (OUTPATIENT)
Dept: HEMATOLOGY/ONCOLOGY | Facility: CLINIC | Age: 79
End: 2019-04-22

## 2020-03-28 ENCOUNTER — HOSPITAL ENCOUNTER (EMERGENCY)
Facility: HOSPITAL | Age: 80
Discharge: HOME | End: 2020-03-28
Attending: EMERGENCY MEDICINE
Payer: MEDICARE

## 2020-03-28 VITALS
BODY MASS INDEX: 18.85 KG/M2 | TEMPERATURE: 98 F | RESPIRATION RATE: 16 BRPM | OXYGEN SATURATION: 99 % | HEART RATE: 62 BPM | WEIGHT: 124 LBS | SYSTOLIC BLOOD PRESSURE: 161 MMHG | DIASTOLIC BLOOD PRESSURE: 72 MMHG
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
## 2020-03-28 DIAGNOSIS — S52.532A CLOSED COLLES' FRACTURE OF LEFT RADIUS, INITIAL ENCOUNTER: ICD-10-CM

## 2020-03-28 DIAGNOSIS — S42.292A OTHER CLOSED DISPLACED FRACTURE OF PROXIMAL END OF LEFT HUMERUS, INITIAL ENCOUNTER: Primary | ICD-10-CM

## 2020-03-28 PROCEDURE — 99284 EMERGENCY DEPT VISIT MOD MDM: CPT | Mod: 25

## 2020-03-28 PROCEDURE — 25000003 PHARM REV CODE 250: Performed by: EMERGENCY MEDICINE

## 2020-03-28 PROCEDURE — 96372 THER/PROPH/DIAG INJ SC/IM: CPT

## 2020-03-28 PROCEDURE — 63600175 PHARM REV CODE 636 W HCPCS: Performed by: EMERGENCY MEDICINE

## 2020-03-28 RX ORDER — MORPHINE SULFATE 4 MG/ML
4 INJECTION, SOLUTION INTRAMUSCULAR; INTRAVENOUS
Status: COMPLETED | OUTPATIENT
Start: 2020-03-28 | End: 2020-03-28

## 2020-03-28 RX ORDER — ONDANSETRON 4 MG/1
4 TABLET, ORALLY DISINTEGRATING ORAL
Status: COMPLETED | OUTPATIENT
Start: 2020-03-28 | End: 2020-03-28

## 2020-03-28 RX ORDER — OXYCODONE AND ACETAMINOPHEN 5; 325 MG/1; MG/1
1 TABLET ORAL
Status: COMPLETED | OUTPATIENT
Start: 2020-03-28 | End: 2020-03-28

## 2020-03-28 RX ORDER — HYDROCODONE BITARTRATE AND ACETAMINOPHEN 5; 325 MG/1; MG/1
1 TABLET ORAL EVERY 4 HOURS PRN
Qty: 18 TABLET | Refills: 0 | Status: ON HOLD | OUTPATIENT
Start: 2020-03-28 | End: 2020-04-09

## 2020-03-28 RX ADMIN — MORPHINE SULFATE 4 MG: 4 INJECTION, SOLUTION INTRAMUSCULAR; INTRAVENOUS at 10:03

## 2020-03-28 RX ADMIN — ONDANSETRON 4 MG: 4 TABLET, ORALLY DISINTEGRATING ORAL at 11:03

## 2020-03-28 RX ADMIN — OXYCODONE HYDROCHLORIDE AND ACETAMINOPHEN 1 TABLET: 5; 325 TABLET ORAL at 11:03

## 2020-03-28 NOTE — ED PROVIDER NOTES
Encounter Date: 3/28/2020    SCRIBE #1 NOTE: IHaja, am scribing for, and in the presence of, Richie Murphy MD.       History     Chief Complaint   Patient presents with    Fall     LEFT arm injury       Time seen by provider: 10:04 AM on 03/28/2020    Joan Josue is a 79 y.o. female with PMHx of osteoporosis and anemia who presents to the ED via EMS with an onset of left arm pain secondary to x1 mechanically falling off of her roof PTA trying to get onto a ladder that tipped over. The patient notes that she feel approximately 6-8 feet to the ground. The patient endorses noticing some left arm numbness and weakness. The patient is unsure where on arm it hurts.     EMS states that the patient denies any LOC or hitting her head. EMS notes no obvious deformity, but they noticed muscle spasms.The patient told EMS she is unable to move her arm. The patient denies any other symptoms at this time. No pertinent PSHx.       The history is provided by the patient, the EMS personnel and medical records.     Review of patient's allergies indicates:   Allergen Reactions    Corticosteroids (glucocorticoids)      Tachycardia      Prednisone      Other reaction(s): tachycardia  Other reaction(s): muscle spasm     Past Medical History:   Diagnosis Date    Anemia     Anemia due to multiple mechanisms 5/14/2018    Hypercholesteremia     Iron deficiency anemia 5/14/2018    Osteoporosis, postmenopausal     Poliomyelitis     as child    Vitamin B12 deficiency anemia 2/19/2018    Vitamin D deficiency disease      Past Surgical History:   Procedure Laterality Date    IUD removed      TONSILLECTOMY      TONSILLECTOMY, ADENOIDECTOMY       Family History   Problem Relation Age of Onset    Stroke Mother     Heart failure Father     Rheum arthritis Father     Cancer Maternal Grandmother     Stroke Maternal Grandfather     Glaucoma Daughter     Stroke Maternal Aunt     Cancer Brother     Amblyopia Neg Hx      Blindness Neg Hx     Cataracts Neg Hx     Diabetes Neg Hx     Hypertension Neg Hx     Macular degeneration Neg Hx     Retinal detachment Neg Hx     Strabismus Neg Hx     Thyroid disease Neg Hx     Breast cancer Neg Hx     Ovarian cancer Neg Hx      Social History     Tobacco Use    Smoking status: Never Smoker    Smokeless tobacco: Never Used   Substance Use Topics    Alcohol use: No     Alcohol/week: 0.0 standard drinks    Drug use: No     Review of Systems   Constitutional: Negative for fever.   HENT: Negative for sore throat.    Respiratory: Negative for shortness of breath.    Cardiovascular: Negative for chest pain.   Gastrointestinal: Negative for nausea.   Genitourinary: Negative for dysuria.   Musculoskeletal: Positive for arthralgias (left arm ). Negative for back pain.   Skin: Negative for rash.   Neurological: Positive for weakness (left arm) and numbness (left arm).   Hematological: Does not bruise/bleed easily.       Physical Exam     Initial Vitals [03/28/20 1004]   BP Pulse Resp Temp SpO2   (!) 161/72 62 16 97.6 °F (36.4 °C) 99 %      MAP       --         Physical Exam    Nursing note and vitals reviewed.  Constitutional: She appears well-developed and well-nourished.  Non-toxic appearance. No distress.   HENT:   Head: Normocephalic and atraumatic.   Eyes: EOM are normal. Pupils are equal, round, and reactive to light.   Neck: Normal range of motion. Neck supple. No neck rigidity. No JVD present.   Cardiovascular: Normal rate, regular rhythm, normal heart sounds and intact distal pulses. Exam reveals no gallop and no friction rub.    No murmur heard.  Pulses:       Carotid pulses are 2+ on the right side, and 2+ on the left side.       Radial pulses are 2+ on the right side, and 2+ on the left side.        Dorsalis pedis pulses are 2+ on the right side, and 2+ on the left side.        Posterior tibial pulses are 2+ on the right side, and 2+ on the left side.   Pulmonary/Chest: Breath  sounds normal. She has no wheezes. She has no rhonchi. She has no rales.   Abdominal: Soft. Bowel sounds are normal. She exhibits no distension. There is no tenderness. There is no rebound and no guarding.   Musculoskeletal: Normal range of motion.    Diffuse tenderness over dorsal left wrist. Diffuse tenderness over left left upper arm.    Neurological: She is alert and oriented to person, place, and time. She has normal strength and normal reflexes. No cranial nerve deficit or sensory deficit. She exhibits normal muscle tone. Coordination normal. GCS eye subscore is 4. GCS verbal subscore is 5. GCS motor subscore is 6.   Skin: Skin is warm and dry.   Psychiatric: She has a normal mood and affect. Her speech is normal and behavior is normal. She is not actively hallucinating.         ED Course   Procedures  Labs Reviewed - No data to display       Imaging Results          X-Ray Elbow 2 Views Left (Final result)  Result time 03/28/20 10:45:43   Procedure changed from X-Ray Elbow Complete Left     Final result by Tavia De La Garza MD (03/28/20 10:45:43)                 Impression:      As above      Electronically signed by: Tavia De La Garza MD  Date:    03/28/2020  Time:    10:45             Narrative:    EXAMINATION:  XR ELBOW 2 VIEWS LEFT    CLINICAL HISTORY:  pain;    TECHNIQUE:  Three views left elbow    COMPARISON:  None    FINDINGS:  No acute fracture or dislocation seen                               X-Ray Wrist 2 View Left (Final result)  Result time 03/28/20 10:45:06   Procedure changed from X-Ray Wrist Complete Left     Final result by Tavia De La Garza MD (03/28/20 10:45:06)                 Impression:      Acute, comminuted intra-articular distal left radial fracture.  Additional findings as detailed above.      Electronically signed by: Tavia De La Garza MD  Date:    03/28/2020  Time:    10:45             Narrative:    EXAMINATION:  XR WRIST 2 VIEW LEFT    CLINICAL HISTORY:  pain;  Unspecified fall, initial  encounter    TECHNIQUE:  Two views left wrist    COMPARISON:  12/29/2015    FINDINGS:  there is comminuted, intra-articular, mildly impacted and mildly dorsally displaced and dorsally angulated distal left radial fracture appearing acute. There are smoothly marginated faint ossifications calcifications distal to the ulnar styloid suggesting calcification the region the triangular fibrocartilage although small old fracture could also be present.    The waist of the navicula is not well visualized with the positioning. Recommend correlation clinically and if consideration for navicular fractures suggested additional views of the wrist.    No dislocation                               X-Ray Shoulder Trauma Left (Final result)  Result time 03/28/20 10:46:43    Final result by Tavia De La Garza MD (03/28/20 10:46:43)                 Impression:      Acute, comminuted displaced fracture left humeral neck      Electronically signed by: Tavia De La Garza MD  Date:    03/28/2020  Time:    10:46             Narrative:    EXAMINATION:  XR SHOULDER TRAUMA 3 VIEW LEFT    TECHNIQUE:  Three views of the left shoulder were performed.    COMPARISON  None    FINDINGS:  There is comminuted, acute, proximal left humeral fracture involving the neck with linear component extending into the proximal shaft and with major distal component anteriorly displaced and impacted.    No dislocation                               X-Ray Humerus 2 View Left (Final result)  Result time 03/28/20 10:48:57    Final result by Tavia De La Garza MD (03/28/20 10:48:57)                 Impression:      Comminuted displaced fracture left humeral neck with fracture also extending in the proximal shaft and involving the greater tuberosity      Electronically signed by: Tavia De La Garza MD  Date:    03/28/2020  Time:    10:48             Narrative:    EXAMINATION:  XR HUMERUS 2 VIEW LEFT    TECHNIQUE:  AP lateral views left humerus    COMPARISON:  Left shoulder the  same day    FINDINGS:  There is comminuted displaced fracture of the left humeral neck which is also seen to involve the greater tuberosity of the humerus and with linear component extending in the proximal humeral shaft.  There is mild displacement and impaction with major distal component anteriorly displaced.  No other fracture                                 Medical Decision Making:   History:   Old Medical Records: I decided to obtain old medical records.  Initial Assessment:   Patient is 79-year-old woman who presents emergency department for evaluation slip and fall off approximately 8 ft from her ladder.  She complains of left upper extremity pain and has difficulty localizing her pain.  She has normal  strength and normal pulses.  She endorses tingling in her hand but is able to feel soft touch and differentiate sharp from dull.  There is no deformity in the skin is intact.  Her compartments are soft.  X-rays of the left upper extremity  Reveal  An acute comminuted intra-articular distal left radial fracture in addition to an acute comminuted displaced fracture of the left humeral neck.  Patient is placed in a ulnar gutter splint and sling and swath.  Discussed with Orthopedics, Dr. Anton who suggest outpatient follow-up this week.  She is provided with Percocet and her pain is improved.  Return precautions were discussed for worsening pain, numbness or any concerns.  Discussed with daughter who will care for her.  Patient is discharged improved in no acute distress.            Scribe Attestation:   Scribe #1: I performed the above scribed service and the documentation accurately describes the services I performed. I attest to the accuracy of the note.     I, Jeffrey Ritchie, personally performed the services described in this documentation. All medical record entries made by the scribe were at my direction and in my presence.  I have reviewed the chart and agree that the record reflects my  personal performance and is accurate and complete. Richie Murphy MD.                The but    Clinical Impression:       ICD-10-CM ICD-9-CM   1. Other closed displaced fracture of proximal end of left humerus, initial encounter S42.292A 812.09   2. Fall W19.XXXA E888.9   3. Closed Colles' fracture of left radius, initial encounter S52.532A 813.41         Disposition:   Disposition: Discharged  Condition: Stable     ED Disposition Condition    Discharge Stable        ED Prescriptions     Medication Sig Dispense Start Date End Date Auth. Provider    HYDROcodone-acetaminophen (NORCO) 5-325 mg per tablet Take 1 tablet by mouth every 4 (four) hours as needed for Pain. 18 tablet 3/28/2020  Richie Murphy MD        Follow-up Information     Follow up With Specialties Details Why Contact Info    Gerald Toussaint MD Sports Medicine, Orthopedic Surgery Schedule an appointment as soon as possible for a visit in 3 days  [address] [place] [address] [address] [place] [id number]  [phone]      [facility] Emergency Medicine  As needed, If symptoms worsen [address] [place] [address] [id number] [phone]                                     Richie Murphy MD  03/29/20 0729

## 2020-03-30 ENCOUNTER — OFFICE VISIT (OUTPATIENT)
Dept: ORTHOPEDICS | Facility: CLINIC | Age: 80
End: 2020-03-30
Payer: MEDICARE

## 2020-03-30 VITALS — RESPIRATION RATE: 16 BRPM | BODY MASS INDEX: 18.79 KG/M2 | HEIGHT: 68 IN | WEIGHT: 124 LBS

## 2020-03-30 DIAGNOSIS — S52.572A OTHER CLOSED INTRA-ARTICULAR FRACTURE OF DISTAL END OF LEFT RADIUS, INITIAL ENCOUNTER: ICD-10-CM

## 2020-03-30 DIAGNOSIS — S42.292A CLOSED 4-PART FRACTURE OF PROXIMAL HUMERUS, LEFT, INITIAL ENCOUNTER: Primary | ICD-10-CM

## 2020-03-30 PROCEDURE — 1101F PT FALLS ASSESS-DOCD LE1/YR: CPT | Mod: CPTII,S$GLB,, | Performed by: ORTHOPAEDIC SURGERY

## 2020-03-30 PROCEDURE — 99999 PR PBB SHADOW E&M-EST. PATIENT-LVL III: CPT | Mod: PBBFAC,,, | Performed by: ORTHOPAEDIC SURGERY

## 2020-03-30 PROCEDURE — 99204 OFFICE O/P NEW MOD 45 MIN: CPT | Mod: S$GLB,,, | Performed by: ORTHOPAEDIC SURGERY

## 2020-03-30 PROCEDURE — 99999 PR PBB SHADOW E&M-EST. PATIENT-LVL III: ICD-10-PCS | Mod: PBBFAC,,, | Performed by: ORTHOPAEDIC SURGERY

## 2020-03-30 PROCEDURE — 1125F PR PAIN SEVERITY QUANTIFIED, PAIN PRESENT: ICD-10-PCS | Mod: S$GLB,,, | Performed by: ORTHOPAEDIC SURGERY

## 2020-03-30 PROCEDURE — 1159F PR MEDICATION LIST DOCUMENTED IN MEDICAL RECORD: ICD-10-PCS | Mod: S$GLB,,, | Performed by: ORTHOPAEDIC SURGERY

## 2020-03-30 PROCEDURE — 99204 PR OFFICE/OUTPT VISIT, NEW, LEVL IV, 45-59 MIN: ICD-10-PCS | Mod: S$GLB,,, | Performed by: ORTHOPAEDIC SURGERY

## 2020-03-30 PROCEDURE — 1125F AMNT PAIN NOTED PAIN PRSNT: CPT | Mod: S$GLB,,, | Performed by: ORTHOPAEDIC SURGERY

## 2020-03-30 PROCEDURE — 1159F MED LIST DOCD IN RCRD: CPT | Mod: S$GLB,,, | Performed by: ORTHOPAEDIC SURGERY

## 2020-03-30 PROCEDURE — 1101F PR PT FALLS ASSESS DOC 0-1 FALLS W/OUT INJ PAST YR: ICD-10-PCS | Mod: CPTII,S$GLB,, | Performed by: ORTHOPAEDIC SURGERY

## 2020-03-30 RX ORDER — HYDROCODONE BITARTRATE AND ACETAMINOPHEN 7.5; 325 MG/1; MG/1
1 TABLET ORAL EVERY 6 HOURS PRN
Qty: 28 TABLET | Refills: 0 | Status: SHIPPED | OUTPATIENT
Start: 2020-03-30

## 2020-03-30 NOTE — PROGRESS NOTES
CC:  79-year-old female presents for evaluation of left wrist and shoulder pain.  The patient fell off of a roof on 03/28/2020.  She was seen in the emergency room where she was diagnosed with a comminuted intra-articular displaced fracture of the left distal radius and a fracture of the left humeral neck.  She presents today for evaluation.  She has a history of a left distal radius fracture about 5 years ago after a fall.    In talking with the patient she seems slightly confused.  She initially did not recall going to the emergency room 2 days ago.    ROS:    Constitution: Denies chills, fever, and sweats.  HENT: Denies headaches or blurry vision.  Cardiovascular: Denies chest pain or irregular heart beat.  Respiratory: Denies cough or shortness of breath.  Gastrointestinal: Denies abdominal pain, nausea, or vomiting.  Genitourinary:  Denies urinary incontinence, bladder and kidney issues  Musculoskeletal:  Denies muscle cramps.  Positive for left shoulder and left wrist pain.  Neurological: Denies dizziness or focal weakness.  Psychiatric/Behavioral: Normal mental status.  Hematologic/Lymphatic: Denies bleeding problem or easy bruising/bleeding.  Skin: Denies rash or suspicious lesions.    Physical examination     Gen - No acute distress, well nourished, well groomed   Eyes - Extraoccular motions intact, pupils equally round and reactive to light and accommodation   ENT - normocephalic, atruamtic, oropharynx clear   Neck - Supple, no abnormal masses   Cardiovascular - regular rate and rhythm   Pulmonary - clear to auscultation bilaterally, no wheezes, ronchi, or rales   Abdomen - soft, non-tender, non-distended, positive bowel sounds   Psych - The patient is alert and oriented x3 with normal mood and affect    Left Upper Extremity Examination     Skin is intact throughout   Motor is intact distally radial, median, ulnar, AIN, PIN   +2 radial and ulnar pulses   Sensation to light touch is intact distally radial  and ulnar, but decreased in the median nerve distribution     Examination of the Left shoulder:   ROM:     Tenderness to palpation:   Subacromial space - positive  Biceps Tendon - positive  Anterior Glenohumeral Joint - positive  AC joint - positive  Provocative maneuvers not applied secondary to fracture the greater backed off off the    Ecchymosis noted over the left proximal humerus and left distal radius  Swelling of the proximal humerus and the distal radius on the left noted     Triggering of fingers or thumb - negative    X-ray images were examined and personally interpreted by me.  Three views of the left shoulder and three views of the left wrist dated 03/28/2020 were available for review.  There is a comminuted 4 part fracture of the left proximal humerus.  There is also a comminuted slightly displaced fracture of the left distal radius.    Dx:  4 part fracture of the left proximal humerus and intra-articular slightly displaced fracture of the left distal radius    Plan:  Recommendation was for reverse total shoulder arthroplasty on the left and ORIF of the left distal radius.  The patient does not want to have surgery at this time due to the recent outbreak of corona virus and her desire not to be in the hospital and risk exposure.  Will continue with the sling and swath and the splint on her left wrist.  Follow-up in 3 weeks with x-rays.    After the visit I had a very long discussion with the patient's daughter.  We went through her injuries and the recommendations for surgery.  The patients daughter stated that she has a friend who is an orthopedic surgeon in [place] and that she had sent pictures of the x-rays to him.  I offered to transfer care to the surgeon in [place] but the daughter states that they will stay here.  I did recommend the daughter get her mother in to see her primary care provider for evaluation for concussion.  Follow-up in 3 weeks with x-rays.

## 2020-04-01 ENCOUNTER — TELEPHONE (OUTPATIENT)
Dept: ORTHOPEDICS | Facility: CLINIC | Age: 80
End: 2020-04-01

## 2020-04-01 NOTE — TELEPHONE ENCOUNTER
----- Message from Violette Norris MA sent at 4/1/2020  9:06 AM CDT -----  Contact: daughter  Nicole   Wants different kind of sling,   Call back [phone]

## 2020-04-02 ENCOUNTER — TELEPHONE (OUTPATIENT)
Dept: ORTHOPEDICS | Facility: CLINIC | Age: 80
End: 2020-04-02

## 2020-04-02 NOTE — TELEPHONE ENCOUNTER
----- Message from Carlitos Mendoza sent at 4/2/2020 11:02 AM CDT -----  Contact: Nicole alston  - [phone]  Question about appt

## 2020-04-03 ENCOUNTER — TELEPHONE (OUTPATIENT)
Dept: ORTHOPEDICS | Facility: CLINIC | Age: 80
End: 2020-04-03

## 2020-04-03 NOTE — TELEPHONE ENCOUNTER
Patient daughter contacted. She will come in 04/06/20 for 1400 to have a cast put on. Fernanda Jorgensen LPN

## 2020-04-03 NOTE — TELEPHONE ENCOUNTER
----- Message from Carlitos Mendoza sent at 4/3/2020  9:56 AM CDT -----  Contact: Nicole alston - [phone]  pt in pain , please call ASAP

## 2020-04-03 NOTE — TELEPHONE ENCOUNTER
Contacted Nicole; discussed with provider. Will either place in a short arm or exos. Daughter will call back with decision. Fernanda Jorgensen LPN

## 2020-04-03 NOTE — TELEPHONE ENCOUNTER
----- Message from Carlitos Mendoza sent at 4/3/2020  1:55 PM CDT -----  Contact: Nicole   daughter   [phone]  Want to speak to the nurse   None

## 2020-04-06 ENCOUNTER — HOSPITAL ENCOUNTER (OUTPATIENT)
Dept: RADIOLOGY | Facility: HOSPITAL | Age: 80
Discharge: HOME | End: 2020-04-06
Attending: ORTHOPAEDIC SURGERY
Payer: MEDICARE

## 2020-04-06 ENCOUNTER — TELEPHONE (OUTPATIENT)
Dept: ORTHOPEDICS | Facility: CLINIC | Age: 80
End: 2020-04-06

## 2020-04-06 DIAGNOSIS — S52.572A OTHER CLOSED INTRA-ARTICULAR FRACTURE OF DISTAL END OF LEFT RADIUS, INITIAL ENCOUNTER: ICD-10-CM

## 2020-04-06 DIAGNOSIS — S52.572A OTHER CLOSED INTRA-ARTICULAR FRACTURE OF DISTAL END OF LEFT RADIUS, INITIAL ENCOUNTER: Primary | ICD-10-CM

## 2020-04-06 PROCEDURE — 73110 X-RAY EXAM OF WRIST: CPT | Mod: 26,LT,, | Performed by: RADIOLOGY

## 2020-04-06 PROCEDURE — 73110 X-RAY EXAM OF WRIST: CPT | Mod: TC,PN,LT

## 2020-04-06 PROCEDURE — 73110 XR WRIST COMPLETE 3 VIEWS LEFT: ICD-10-PCS | Mod: 26,LT,, | Performed by: RADIOLOGY

## 2020-04-06 NOTE — TELEPHONE ENCOUNTER
"Patient came into clinic today for cast, as she was taking off the orthoglass ulnar gutter splint off at home and moving her wrist.Sling that was placed on 04/03/20 was cut since application as well. After provider reviewed xrays taken today, remotely, it was decided to place the patient in a plaster sugar tong splint and have them come back next clinic day to discuss surgery, as wrist fracture has displaced dorsally per provider. Patient and daughter agreeable. Will reach out to PCP and all/any primary care provider that can provide a medical clearance ASAP.     Patient did not have CT of head per Dr. Plata recommendation after falling off ladder on original DOI. Daughter stated she never brought the patient, as she began to "come back to normal".      "

## 2020-04-06 NOTE — TELEPHONE ENCOUNTER
----- Message from Carlitos Mendoza sent at 4/6/2020  3:42 PM CDT -----  Contact: Nicole alston  [phone]  Pre op info

## 2020-04-06 NOTE — TELEPHONE ENCOUNTER
Contacted daughter. Wanted to inform office that Dr. Plata is in network. Fernanda Jorgensen, [place]

## 2020-04-07 ENCOUNTER — TELEPHONE (OUTPATIENT)
Dept: FAMILY MEDICINE | Facility: CLINIC | Age: 80
End: 2020-04-07

## 2020-04-07 ENCOUNTER — TELEPHONE (OUTPATIENT)
Dept: ORTHOPEDICS | Facility: CLINIC | Age: 80
End: 2020-04-07

## 2020-04-07 NOTE — TELEPHONE ENCOUNTER
----- Message from Violette Norris MA sent at 4/7/2020  3:35 PM CDT -----  Contact: dawit doe  [phone]   Questions on wrist

## 2020-04-07 NOTE — TELEPHONE ENCOUNTER
----- Message from Fernanda Jorgensen LPN sent at 4/6/2020  3:17 PM CDT -----  Regarding: URGENT PREOPERATIVE CLEARANCE REQUEST  Good afternoon. This patient is being booked for fracture fixation of left distal radius fracture, with potential reverse total shoulder arthroplasty, left; tentatively being booked for 04/10/2020. She will need preoperative clearance ASAP. She is not consistent with PCP, therefore, any available provider is fine. Is anyone able to accommodate this patient's urgent clearance need?    Please advise on how to proceed.     Best regards,   Fernanda Jorgensen LPN ROT on behalf of BULMARO Plata II MD [facility]

## 2020-04-07 NOTE — TELEPHONE ENCOUNTER
Patient has been dismissed from Department.   Supervisors stated this patient was sent to Urgent Care for her pre op.

## 2020-04-08 ENCOUNTER — TELEPHONE (OUTPATIENT)
Dept: ORTHOPEDICS | Facility: CLINIC | Age: 80
End: 2020-04-08

## 2020-04-08 DIAGNOSIS — S52.572A OTHER CLOSED INTRA-ARTICULAR FRACTURE OF DISTAL END OF LEFT RADIUS, INITIAL ENCOUNTER: Primary | ICD-10-CM

## 2020-04-08 PROBLEM — S52.502A CLOSED FRACTURE OF LEFT DISTAL RADIUS: Status: ACTIVE | Noted: 2020-04-08

## 2020-04-08 RX ORDER — CEFAZOLIN SODIUM 2 G/50ML
2 SOLUTION INTRAVENOUS
Status: CANCELLED | OUTPATIENT
Start: 2020-04-08

## 2020-04-08 RX ORDER — MUPIROCIN 20 MG/G
OINTMENT TOPICAL
Status: CANCELLED | OUTPATIENT
Start: 2020-04-08

## 2020-04-08 NOTE — TELEPHONE ENCOUNTER
----- Message from Violette Norris MA sent at 4/8/2020 10:35 AM CDT -----  Contact: marilia  daughter   Surgery tomorrow,   Wants to discuss her valve, and etc  Call back

## 2020-04-08 NOTE — TELEPHONE ENCOUNTER
Attempted to contact. No answer. Left message to return call. Unsure if patient having surgery tomorrow or not. Fernanda Jorgensen LPN

## 2020-04-09 ENCOUNTER — OFFICE VISIT (OUTPATIENT)
Dept: ORTHOPEDICS | Facility: CLINIC | Age: 80
End: 2020-04-09
Payer: MEDICARE

## 2020-04-09 ENCOUNTER — ANESTHESIA EVENT (OUTPATIENT)
Dept: SURGERY | Facility: HOSPITAL | Age: 80
End: 2020-04-09
Payer: MEDICARE

## 2020-04-09 ENCOUNTER — HOSPITAL ENCOUNTER (OUTPATIENT)
Facility: HOSPITAL | Age: 80
Discharge: HOME | End: 2020-04-09
Attending: ORTHOPAEDIC SURGERY | Admitting: ORTHOPAEDIC SURGERY
Payer: MEDICARE

## 2020-04-09 ENCOUNTER — ANESTHESIA (OUTPATIENT)
Dept: SURGERY | Facility: HOSPITAL | Age: 80
End: 2020-04-09
Payer: MEDICARE

## 2020-04-09 VITALS — RESPIRATION RATE: 16 BRPM | WEIGHT: 124 LBS | HEIGHT: 68 IN | BODY MASS INDEX: 18.79 KG/M2

## 2020-04-09 VITALS
SYSTOLIC BLOOD PRESSURE: 172 MMHG | HEART RATE: 60 BPM | RESPIRATION RATE: 16 BRPM | WEIGHT: 124 LBS | TEMPERATURE: 97 F | HEIGHT: 69 IN | OXYGEN SATURATION: 97 % | BODY MASS INDEX: 18.37 KG/M2 | DIASTOLIC BLOOD PRESSURE: 72 MMHG

## 2020-04-09 DIAGNOSIS — S52.572A OTHER CLOSED INTRA-ARTICULAR FRACTURE OF DISTAL END OF LEFT RADIUS, INITIAL ENCOUNTER: ICD-10-CM

## 2020-04-09 DIAGNOSIS — S52.572A OTHER CLOSED INTRA-ARTICULAR FRACTURE OF DISTAL END OF LEFT RADIUS, INITIAL ENCOUNTER: Primary | ICD-10-CM

## 2020-04-09 DIAGNOSIS — S52.572G OTHER CLOSED INTRA-ARTICULAR FRACTURE OF DISTAL END OF LEFT RADIUS WITH DELAYED HEALING, SUBSEQUENT ENCOUNTER: Primary | ICD-10-CM

## 2020-04-09 LAB
ANION GAP SERPL CALC-SCNC: 7 MMOL/L (ref 8–16)
BASOPHILS # BLD AUTO: 0.04 K/UL (ref 0–0.2)
BASOPHILS NFR BLD: 0.8 % (ref 0–1.9)
BUN SERPL-MCNC: 5 MG/DL (ref 8–23)
CALCIUM SERPL-MCNC: 9.5 MG/DL (ref 8.7–10.5)
CHLORIDE SERPL-SCNC: 104 MMOL/L (ref 95–110)
CO2 SERPL-SCNC: 29 MMOL/L (ref 23–29)
CREAT SERPL-MCNC: 0.6 MG/DL (ref 0.5–1.4)
DIFFERENTIAL METHOD: ABNORMAL
EOSINOPHIL # BLD AUTO: 0.2 K/UL (ref 0–0.5)
EOSINOPHIL NFR BLD: 3.7 % (ref 0–8)
ERYTHROCYTE [DISTWIDTH] IN BLOOD BY AUTOMATED COUNT: 13.5 % (ref 11.5–14.5)
EST. GFR  (AFRICAN AMERICAN): >60 ML/MIN/1.73 M^2
EST. GFR  (NON AFRICAN AMERICAN): >60 ML/MIN/1.73 M^2
GLUCOSE SERPL-MCNC: 104 MG/DL (ref 70–110)
HCT VFR BLD AUTO: 30.1 % (ref 37–48.5)
HGB BLD-MCNC: 9.7 G/DL (ref 12–16)
IMM GRANULOCYTES # BLD AUTO: 0.01 K/UL (ref 0–0.04)
IMM GRANULOCYTES NFR BLD AUTO: 0.2 % (ref 0–0.5)
LYMPHOCYTES # BLD AUTO: 1 K/UL (ref 1–4.8)
LYMPHOCYTES NFR BLD: 20.6 % (ref 18–48)
MCH RBC QN AUTO: 33.3 PG (ref 27–31)
MCHC RBC AUTO-ENTMCNC: 32.2 G/DL (ref 32–36)
MCV RBC AUTO: 103 FL (ref 82–98)
MONOCYTES # BLD AUTO: 0.5 K/UL (ref 0.3–1)
MONOCYTES NFR BLD: 9.9 % (ref 4–15)
NEUTROPHILS # BLD AUTO: 3.2 K/UL (ref 1.8–7.7)
NEUTROPHILS NFR BLD: 64.8 % (ref 38–73)
NRBC BLD-RTO: 0 /100 WBC
PLATELET # BLD AUTO: 387 K/UL (ref 150–350)
PMV BLD AUTO: 9 FL (ref 9.2–12.9)
POTASSIUM SERPL-SCNC: 3.9 MMOL/L (ref 3.5–5.1)
RBC # BLD AUTO: 2.91 M/UL (ref 4–5.4)
SODIUM SERPL-SCNC: 140 MMOL/L (ref 136–145)
WBC # BLD AUTO: 4.86 K/UL (ref 3.9–12.7)

## 2020-04-09 PROCEDURE — 25000003 PHARM REV CODE 250: Performed by: ANESTHESIOLOGY

## 2020-04-09 PROCEDURE — 99900103 DSU ONLY-NO CHARGE-INITIAL HR (STAT): Performed by: ORTHOPAEDIC SURGERY

## 2020-04-09 PROCEDURE — 99024 PR POST-OP FOLLOW-UP VISIT: ICD-10-PCS | Mod: S$GLB,,, | Performed by: ORTHOPAEDIC SURGERY

## 2020-04-09 PROCEDURE — 27200651 HC AIRWAY, LMA: Performed by: ANESTHESIOLOGY

## 2020-04-09 PROCEDURE — C1713 ANCHOR/SCREW BN/BN,TIS/BN: HCPCS | Performed by: ORTHOPAEDIC SURGERY

## 2020-04-09 PROCEDURE — 25609 PR OPEN RX DISTAL RADIUS FX, INTRA-ARTICULAR, 3+ FRAG: ICD-10-PCS | Mod: LT,,, | Performed by: ORTHOPAEDIC SURGERY

## 2020-04-09 PROCEDURE — 25000003 PHARM REV CODE 250: Performed by: ORTHOPAEDIC SURGERY

## 2020-04-09 PROCEDURE — 63600175 PHARM REV CODE 636 W HCPCS: Performed by: ORTHOPAEDIC SURGERY

## 2020-04-09 PROCEDURE — 99999 PR PBB SHADOW E&M-EST. PATIENT-LVL III: ICD-10-PCS | Mod: PBBFAC,,, | Performed by: ORTHOPAEDIC SURGERY

## 2020-04-09 PROCEDURE — 37000008 HC ANESTHESIA 1ST 15 MINUTES: Performed by: ORTHOPAEDIC SURGERY

## 2020-04-09 PROCEDURE — 99024 POSTOP FOLLOW-UP VISIT: CPT | Mod: S$GLB,,, | Performed by: ORTHOPAEDIC SURGERY

## 2020-04-09 PROCEDURE — 25609 OPTX DST RD XART FX/EP SEP3+: CPT | Mod: LT,,, | Performed by: ORTHOPAEDIC SURGERY

## 2020-04-09 PROCEDURE — D9220A PRA ANESTHESIA: Mod: ANES,,, | Performed by: ANESTHESIOLOGY

## 2020-04-09 PROCEDURE — 36000711: Performed by: ORTHOPAEDIC SURGERY

## 2020-04-09 PROCEDURE — D9220A PRA ANESTHESIA: ICD-10-PCS | Mod: CRNA,,, | Performed by: NURSE ANESTHETIST, CERTIFIED REGISTERED

## 2020-04-09 PROCEDURE — 63600175 PHARM REV CODE 636 W HCPCS: Performed by: NURSE ANESTHETIST, CERTIFIED REGISTERED

## 2020-04-09 PROCEDURE — 99999 PR PBB SHADOW E&M-EST. PATIENT-LVL III: CPT | Mod: PBBFAC,,, | Performed by: ORTHOPAEDIC SURGERY

## 2020-04-09 PROCEDURE — 93005 ELECTROCARDIOGRAM TRACING: CPT | Mod: 59

## 2020-04-09 PROCEDURE — 36415 COLL VENOUS BLD VENIPUNCTURE: CPT

## 2020-04-09 PROCEDURE — 27201423 OPTIME MED/SURG SUP & DEVICES STERILE SUPPLY: Performed by: ORTHOPAEDIC SURGERY

## 2020-04-09 PROCEDURE — 71000033 HC RECOVERY, INTIAL HOUR: Performed by: ORTHOPAEDIC SURGERY

## 2020-04-09 PROCEDURE — 36000710: Performed by: ORTHOPAEDIC SURGERY

## 2020-04-09 PROCEDURE — 63600175 PHARM REV CODE 636 W HCPCS: Performed by: ANESTHESIOLOGY

## 2020-04-09 PROCEDURE — 99900104 DSU ONLY-NO CHARGE-EA ADD'L HR (STAT): Performed by: ORTHOPAEDIC SURGERY

## 2020-04-09 PROCEDURE — 37000009 HC ANESTHESIA EA ADD 15 MINS: Performed by: ORTHOPAEDIC SURGERY

## 2020-04-09 PROCEDURE — 85025 COMPLETE CBC W/AUTO DIFF WBC: CPT

## 2020-04-09 PROCEDURE — D9220A PRA ANESTHESIA: ICD-10-PCS | Mod: ANES,,, | Performed by: ANESTHESIOLOGY

## 2020-04-09 PROCEDURE — 71000015 HC POSTOP RECOV 1ST HR: Performed by: ORTHOPAEDIC SURGERY

## 2020-04-09 PROCEDURE — 80048 BASIC METABOLIC PNL TOTAL CA: CPT

## 2020-04-09 PROCEDURE — D9220A PRA ANESTHESIA: Mod: CRNA,,, | Performed by: NURSE ANESTHETIST, CERTIFIED REGISTERED

## 2020-04-09 DEVICE — SCREW STRDRV REC T8 2.7X12 SS: Type: IMPLANTABLE DEVICE | Site: WRIST | Status: FUNCTIONAL

## 2020-04-09 DEVICE — SCREW 2.4 X 22MM ANGLE RADIUS: Type: IMPLANTABLE DEVICE | Site: WRIST | Status: FUNCTIONAL

## 2020-04-09 DEVICE — PLATE COMPRESSION LOCKING: Type: IMPLANTABLE DEVICE | Site: WRIST | Status: FUNCTIONAL

## 2020-04-09 DEVICE — SCREW LOCKING 2.4 X 16MM: Type: IMPLANTABLE DEVICE | Site: WRIST | Status: FUNCTIONAL

## 2020-04-09 DEVICE — SCREW LOCKING 2.4 X 20MM: Type: IMPLANTABLE DEVICE | Site: WRIST | Status: FUNCTIONAL

## 2020-04-09 DEVICE — SCREW VA LOCK STARDRIVE 2.4X12: Type: IMPLANTABLE DEVICE | Site: WRIST | Status: FUNCTIONAL

## 2020-04-09 DEVICE — SCREW LOCKING 2.4 X 18MM: Type: IMPLANTABLE DEVICE | Site: WRIST | Status: FUNCTIONAL

## 2020-04-09 RX ORDER — OXYCODONE HYDROCHLORIDE 5 MG/1
5 TABLET ORAL ONCE AS NEEDED
Status: COMPLETED | OUTPATIENT
Start: 2020-04-09 | End: 2020-04-09

## 2020-04-09 RX ORDER — PROMETHAZINE HYDROCHLORIDE 25 MG/ML
INJECTION, SOLUTION INTRAMUSCULAR; INTRAVENOUS
Status: DISCONTINUED | OUTPATIENT
Start: 2020-04-09 | End: 2020-04-09

## 2020-04-09 RX ORDER — FENTANYL CITRATE 50 UG/ML
INJECTION, SOLUTION INTRAMUSCULAR; INTRAVENOUS
Status: DISCONTINUED | OUTPATIENT
Start: 2020-04-09 | End: 2020-04-09

## 2020-04-09 RX ORDER — SODIUM CHLORIDE, SODIUM LACTATE, POTASSIUM CHLORIDE, CALCIUM CHLORIDE 600; 310; 30; 20 MG/100ML; MG/100ML; MG/100ML; MG/100ML
INJECTION, SOLUTION INTRAVENOUS CONTINUOUS
Status: DISCONTINUED | OUTPATIENT
Start: 2020-04-09 | End: 2020-04-09 | Stop reason: HOSPADM

## 2020-04-09 RX ORDER — MIDAZOLAM HYDROCHLORIDE 1 MG/ML
INJECTION, SOLUTION INTRAMUSCULAR; INTRAVENOUS
Status: DISCONTINUED | OUTPATIENT
Start: 2020-04-09 | End: 2020-04-09

## 2020-04-09 RX ORDER — ONDANSETRON HYDROCHLORIDE 2 MG/ML
INJECTION, SOLUTION INTRAMUSCULAR; INTRAVENOUS
Status: DISCONTINUED | OUTPATIENT
Start: 2020-04-09 | End: 2020-04-09

## 2020-04-09 RX ORDER — LIDOCAINE HCL/PF 100 MG/5ML
SYRINGE (ML) INTRAVENOUS
Status: DISCONTINUED | OUTPATIENT
Start: 2020-04-09 | End: 2020-04-09

## 2020-04-09 RX ORDER — MUPIROCIN 20 MG/G
OINTMENT TOPICAL
Status: DISCONTINUED | OUTPATIENT
Start: 2020-04-09 | End: 2020-04-09 | Stop reason: HOSPADM

## 2020-04-09 RX ORDER — HYDROCODONE BITARTRATE AND ACETAMINOPHEN 7.5; 325 MG/1; MG/1
1 TABLET ORAL EVERY 6 HOURS PRN
Qty: 28 TABLET | Refills: 0 | Status: SHIPPED | OUTPATIENT
Start: 2020-04-09 | End: 2020-04-16

## 2020-04-09 RX ORDER — FENTANYL CITRATE 50 UG/ML
25 INJECTION, SOLUTION INTRAMUSCULAR; INTRAVENOUS EVERY 5 MIN PRN
Status: COMPLETED | OUTPATIENT
Start: 2020-04-09 | End: 2020-04-09

## 2020-04-09 RX ORDER — ACETAMINOPHEN 10 MG/ML
INJECTION, SOLUTION INTRAVENOUS
Status: DISCONTINUED | OUTPATIENT
Start: 2020-04-09 | End: 2020-04-09

## 2020-04-09 RX ORDER — PROPOFOL 10 MG/ML
VIAL (ML) INTRAVENOUS
Status: DISCONTINUED | OUTPATIENT
Start: 2020-04-09 | End: 2020-04-09

## 2020-04-09 RX ORDER — ASPIRIN 325 MG
325 TABLET ORAL DAILY PRN
COMMUNITY

## 2020-04-09 RX ORDER — CEFAZOLIN SODIUM 2 G/50ML
2 SOLUTION INTRAVENOUS
Status: COMPLETED | OUTPATIENT
Start: 2020-04-09 | End: 2020-04-09

## 2020-04-09 RX ORDER — ONDANSETRON 2 MG/ML
4 INJECTION INTRAMUSCULAR; INTRAVENOUS ONCE AS NEEDED
Status: COMPLETED | OUTPATIENT
Start: 2020-04-09 | End: 2020-04-09

## 2020-04-09 RX ORDER — LIDOCAINE HYDROCHLORIDE 10 MG/ML
0.5 INJECTION, SOLUTION EPIDURAL; INFILTRATION; INTRACAUDAL; PERINEURAL ONCE
Status: COMPLETED | OUTPATIENT
Start: 2020-04-09 | End: 2020-04-09

## 2020-04-09 RX ADMIN — CEFAZOLIN SODIUM 2 G: 2 SOLUTION INTRAVENOUS at 03:04

## 2020-04-09 RX ADMIN — MUPIROCIN: 20 OINTMENT TOPICAL at 11:04

## 2020-04-09 RX ADMIN — ONDANSETRON 4 MG: 2 INJECTION INTRAMUSCULAR; INTRAVENOUS at 04:04

## 2020-04-09 RX ADMIN — FENTANYL CITRATE 25 MCG: 50 INJECTION, SOLUTION INTRAMUSCULAR; INTRAVENOUS at 04:04

## 2020-04-09 RX ADMIN — PROMETHAZINE HYDROCHLORIDE 6.25 MG: 25 INJECTION INTRAMUSCULAR; INTRAVENOUS at 03:04

## 2020-04-09 RX ADMIN — MIDAZOLAM 2 MG: 1 INJECTION INTRAMUSCULAR; INTRAVENOUS at 02:04

## 2020-04-09 RX ADMIN — ONDANSETRON 4 MG: 2 INJECTION, SOLUTION INTRAMUSCULAR; INTRAVENOUS at 02:04

## 2020-04-09 RX ADMIN — LIDOCAINE HYDROCHLORIDE 75 MG: 20 INJECTION, SOLUTION INTRAVENOUS at 02:04

## 2020-04-09 RX ADMIN — ACETAMINOPHEN 1000 MG: 10 INJECTION, SOLUTION INTRAVENOUS at 03:04

## 2020-04-09 RX ADMIN — FENTANYL CITRATE 50 MCG: 50 INJECTION, SOLUTION INTRAMUSCULAR; INTRAVENOUS at 03:04

## 2020-04-09 RX ADMIN — OXYCODONE HYDROCHLORIDE 5 MG: 5 TABLET ORAL at 04:04

## 2020-04-09 RX ADMIN — SODIUM CHLORIDE, SODIUM GLUCONATE, SODIUM ACETATE, POTASSIUM CHLORIDE, MAGNESIUM CHLORIDE, SODIUM PHOSPHATE, DIBASIC, AND POTASSIUM PHOSPHATE: .53; .5; .37; .037; .03; .012; .00082 INJECTION, SOLUTION INTRAVENOUS at 11:04

## 2020-04-09 RX ADMIN — SODIUM CHLORIDE, SODIUM GLUCONATE, SODIUM ACETATE, POTASSIUM CHLORIDE, MAGNESIUM CHLORIDE, SODIUM PHOSPHATE, DIBASIC, AND POTASSIUM PHOSPHATE: .53; .5; .37; .037; .03; .012; .00082 INJECTION, SOLUTION INTRAVENOUS at 03:04

## 2020-04-09 RX ADMIN — FENTANYL CITRATE 50 MCG: 50 INJECTION, SOLUTION INTRAMUSCULAR; INTRAVENOUS at 02:04

## 2020-04-09 RX ADMIN — LIDOCAINE HYDROCHLORIDE 5 MG: 10 INJECTION, SOLUTION EPIDURAL; INFILTRATION; INTRACAUDAL; PERINEURAL at 11:04

## 2020-04-09 RX ADMIN — PROPOFOL 150 MG: 10 INJECTION, EMULSION INTRAVENOUS at 02:04

## 2020-04-09 NOTE — ANESTHESIA PREPROCEDURE EVALUATION
04/09/2020  Joan Josue is a 79 y.o., female.    Anesthesia Evaluation    I have reviewed the Patient Summary Reports.    I have reviewed the Nursing Notes.   I have reviewed the Medications.     Review of Systems  Anesthesia Hx:  No problems with previous Anesthesia    Social:  Former Smoker    Hematology/Oncology:     Oncology Normal    -- Anemia:   EENT/Dental:EENT/Dental Normal   Cardiovascular:   ECG has been reviewed. Hypercholesterolemia    Pulmonary:  Pulmonary Normal    Hepatic/GI:  Hepatic/GI Normal    Musculoskeletal:   Distal left radial fracture    Neurological:   Poliomyelitis    Endocrine:  Endocrine Normal    Dermatological:  Skin Normal    Psych:  Psychiatric Normal           Physical Exam  General:  Well nourished    Airway/Jaw/Neck:  Airway Findings: Mouth Opening: Normal Tongue: Normal  General Airway Assessment: Adult  Mallampati: II  TM Distance: Normal, at least 6 cm        Eyes/Ears/Nose:  EYES/EARS/NOSE FINDINGS: Normal   Dental:  Dental Findings: In tact   Chest/Lungs:  Chest/Lungs Findings: Clear to auscultation, Normal Respiratory Rate     Heart/Vascular:  Heart Findings: Rate: Normal  Rhythm: Regular Rhythm        Mental Status:  Mental Status Findings:  Cooperative, Alert and Oriented         Anesthesia Plan  Type of Anesthesia, risks & benefits discussed:  Anesthesia Type:  general  Patient's Preference:   Intra-op Monitoring Plan: standard ASA monitors  Intra-op Monitoring Plan Comments:   Post Op Pain Control Plan: IV/PO Opioids PRN and multimodal analgesia  Post Op Pain Control Plan Comments:   Induction:   IV  Beta Blocker:  Patient is not currently on a Beta-Blocker (No further documentation required).       Informed Consent: Patient understands risks and agrees with Anesthesia plan.  Questions answered. Anesthesia consent signed with patient.  ASA Score: 2     Day of  Surgery Review of History & Physical: I have interviewed and examined the patient. I have reviewed the patient's H&P dated:    H&P update referred to the surgeon.         Ready For Surgery From Anesthesia Perspective.

## 2020-04-09 NOTE — DISCHARGE INSTRUCTIONS
Understanding a Distal Radius Fracture      A fracture is a broken bone. A fracture in the distal radius is a break in the lower end of the radius. This is the larger bone in the forearm. Because the break occurs near the wrist, it is often called a wrist fracture.    The bone may be cracked, or it may be broken into 2 or more pieces. The pieces of bone may be lined up or they may have moved out of place. Sometimes, the bone may break through the skin. Nearby nerves, tissues, and joints also may be damaged. Depending on the severity of the fracture, healing may take several months or longer.  What causes a distal radius fracture?  This type of fracture is most often caused from a fall on an outstretched hand. It can also be caused from a blow, accident, or sports injury.  Symptoms of a distal radius fracture  Symptoms can include pain, swelling, and bruising. If the bone breaks through the skin, external bleeding can also occur. The wrist may look crooked, deformed, or bent. It may be hard to move or use the arm, wrist, and hand for normal tasks and activities.  Treating a distal radius fracture  Treatment depends on how serious the fracture is. If needed, the bone is put back into place. This may be done with or without surgery. If surgery is needed, the surgeon may use devices such as pins, plates, or screws to hold the bone together. You may need to wear a splint or cast for a month or longer to protect the bone and keep it in place during healing. Other treatments may be also used to help reduce symptoms or regain function. These include:  · Cold packs. Putting an ice pack on the injured area may help reduce swelling and pain.  · Raising the arm and wrist. Keeping the arm and wrist raised above heart level may help reduce swelling.  · Pain medicines. Taking prescription or over-the-counter pain medicines may help reduce pain and swelling.  · Exercises. Doing certain exercises at home or with a physical  therapist can help restore strength, flexibility, and range of motion in your arm, wrist, and hand. In general, exercises are not started until after the splint or cast is removed.  Possible complications of a distal radius fracture  These can include:  · Poor healing of the bone  · Weakness, stiffness, or loss of range of motion in the arm, wrist, or hand  · Osteoarthritis in the wrist joint  When to call your healthcare provider  Call your healthcare provider right away if you have any of these:  · Fever of 100.4°F (38°C) or higher, or as directed  · Symptoms that dont get better with treatment, or get worse  · Numbness, coldness, or swelling in your arm, hand, or fingers  · Fingernails that turn blue or gray in color  · A splint or cast that is damaged or feels too tight or loose  · New symptoms   Date Last Reviewed: 3/10/2016  © 2124-3549 [employer]. [address] [place] [address]. All rights reserved. This information is not intended as a substitute for professional medical care. Always follow your healthcare professional's instructions.    Post op instructions for prevention of DVT  What is deep vein thrombosis?  Deep vein thrombosis (DVT) is the medical term for blood clots in the deep veins of the leg.  These blood clots can be dangerous.  A DVT can block a blood vessel and keep blood from getting where it needs to go.  Another problem is that the clot can travel to other parts of the body such as the lungs.  A clot that travels to the lungs is called a pulmonary embolus (PE) and can cause serious problems with breathing which can lead to death.  Am I at risk for DVT/PE?  If you are not very active, you are at risk of DVT.  Anyone confined to bed, sitting for long periods of time, recovering from surgery, etc. increases the risk of DVT.  Other risk factors are cancer diagnosis, certain medications, estrogen replacement in any form,older age, obesity, pregnancy, smoking, history of  clotting disorders, and dehydration.  How will I know if I have a DVT?   Swelling in the lower leg   Pain   Warmth, redness, hardness or bulging of the vein  If you have any of these symptoms, call your doctors office right away.  Some people will not have any symptoms until the clot moves to the lungs.  What are the symptoms of a PE?   Panting, shortness of breath, or trouble breathing   Sharp, knife-like chest pain when you breathe   Coughing or coughing up blood   Rapid heartbeat  If you have any of these symptoms or get worse quickly, call 911 for emergency treatment.  How can I prevent a DVT?   Avoid long periods of inactivity and dont cross your legs--get up and walk around every hour or so.   Stay active--walking after surgery is highly encouraged.  This means you should get out of the house and walk in the neighborhood.  Going up and down stairs will not impair healing (unless advised against such activity by your doctor).     Drink plenty of noncaffeinated, nonalcoholic fluids each day to prevent dehydration.   Wear special support stockings, if they have been advised by your doctor.   If you travel, stop at least once an hour and walk around.   Avoid smoking (assistance with stopping is available through your healthcare provider)  Always notify your doctor if you are not able to follow the post operative instructions that are given to you at the time of discharge.  It may be necessary to prescribe one of the medications available to prevent DVT.Using Opioids for Pain Management     Your doctor has given instructions for you to take an opioid.  This is a drug for bad pain.  It helps control pain without causing bleeding and kidney problems.  Common opioid names are morphine, hydromorphone, oxycodone, and methadone. These drugs are called narcotics.    There are several safety concerns you need to know.     · It is against the law to give or sell this drug to another person.  You must keep  this medicine safely locked.    · You may have side effects from taking this medication.  These include nausea, itching, sweating, sleepiness, a change in your ability to breathe, and depression.  · Do not take alcohol or sleeping pills opioids.    · Long-term opoid use may no longer giver you relief from pain.  It can cause you stomach pain, mental anxiety, and headaches.  Long-term opoid use can potentially lead to unlawful street drug abuse and reduce your ability to stay employed.    · Your body may become opioid tolerant if you need to take more to get relief.    · You must stop taking opioids if you begin having more pain as a result of the medicine.    · Opioid withdrawal occurs when you have to stop taking the drug.  It can cause you to have nausea, vomiting, diarrhea, stomach pain, anxiety, and dilated pupils in your eyes. This condition means you are opioid dependent.    · Addiction is a drug induced brain disease. It means there are changes in how your brain is working.  Children, teens, and young adults under 25 years old are more likely to get addicted to opioids.      · Addiction can happen with repeated opioid use.  It does not happen with short-term use of two weeks or less.       For more information, please speak with your doctor or pharmacist.  Discharge Instructions: After Your Surgery/Procedure  Youve just had surgery. During surgery you were given medicine called anesthesia to keep you relaxed and free of pain. After surgery you may have some pain or nausea. This is common. Here are some tips for feeling better and getting well after surgery.     Stay on schedule with your medication.   Going home  Your doctor or nurse will show you how to take care of yourself when you go home. He or she will also answer your questions. Have an adult family member or friend drive you home.      For your safety we recommend these precaution for the first 24 hours after your procedure:  · Do not drive or use  "heavy equipment.  · Do not make important decisions or sign legal papers.  · Do not drink alcohol.  · Have someone stay with you, if needed. He or she can watch for problems and help keep you safe.  · Your concentration, balance, coordination, and judgement may be impaired for many hours after anesthesia.  Use caution when ambulating or standing up.     · You may feel weak and "washed out" after anesthesia and surgery.      Subtle residual effects of general anesthesia or sedation with regional / local anesthesia can last more than 24 hours.  Rest for the remainder of the day or longer if your Doctor/Surgeon has advised you to do so.  Although you may feel normal within the first 24 hours, your reflexes and mental ability may be impaired without you realizing it.  You may feel dizzy, lightheaded or sleepy for 24 hours or longer.      Be sure to go to all follow-up visits with your doctor. And rest after your surgery for as long as your doctor tells you to.  Coping with pain  If you have pain after surgery, pain medicine will help you feel better. Take it as told, before pain becomes severe. Also, ask your doctor or pharmacist about other ways to control pain. This might be with heat, ice, or relaxation. And follow any other instructions your surgeon or nurse gives you.  Tips for taking pain medicine  To get the best relief possible, remember these points:  · Pain medicines can upset your stomach. Taking them with a little food may help.  · Most pain relievers taken by mouth need at least 20 to 30 minutes to start to work.  · Taking medicine on a schedule can help you remember to take it. Try to time your medicine so that you can take it before starting an activity. This might be before you get dressed, go for a walk, or sit down for dinner.  · Constipation is a common side effect of pain medicines. Call your doctor before taking any medicines such as laxatives or stool softeners to help ease constipation. Also ask " if you should skip any foods. Drinking lots of fluids and eating foods such as fruits and vegetables that are high in fiber can also help. Remember, do not take laxatives unless your surgeon has prescribed them.  · Drinking alcohol and taking pain medicine can cause dizziness and slow your breathing. It can even be deadly. Do not drink alcohol while taking pain medicine.  · Pain medicine can make you react more slowly to things. Do not drive or run machinery while taking pain medicine.  Your health care provider may tell you to take acetaminophen to help ease your pain. Ask him or her how much you are supposed to take each day. Acetaminophen or other pain relievers may interact with your prescription medicines or other over-the-counter (OTC) drugs. Some prescription medicines have acetaminophen and other ingredients. Using both prescription and OTC acetaminophen for pain can cause you to overdose. Read the labels on your OTC medicines with care. This will help you to clearly know the list of ingredients, how much to take, and any warnings. It may also help you not take too much acetaminophen. If you have questions or do not understand the information, ask your pharmacist or health care provider to explain it to you before you take the OTC medicine.  Managing nausea  Some people have an upset stomach after surgery. This is often because of anesthesia, pain, or pain medicine, or the stress of surgery. These tips will help you handle nausea and eat healthy foods as you get better. If you were on a special food plan before surgery, ask your doctor if you should follow it while you get better. These tips may help:  · Do not push yourself to eat. Your body will tell you when to eat and how much.  · Start off with clear liquids and soup. They are easier to digest.  · Next try semi-solid foods, such as mashed potatoes, applesauce, and gelatin, as you feel ready.  · Slowly move to solid foods. Dont eat fatty, rich, or spicy  foods at first.  · Do not force yourself to have 3 large meals a day. Instead eat smaller amounts more often.  · Take pain medicines with a small amount of solid food, such as crackers or toast, to avoid nausea.     Call your surgeon if  · You still have pain an hour after taking medicine. The medicine may not be strong enough.  · You feel too sleepy, dizzy, or groggy. The medicine may be too strong.  · You have side effects like nausea, vomiting, or skin changes, such as rash, itching, or hives.       If you have obstructive sleep apnea  You were given anesthesia medicine during surgery to keep you comfortable and free of pain. After surgery, you may have more apnea spells because of this medicine and other medicines you were given. The spells may last longer than usual.   At home:  · Keep using the continuous positive airway pressure (CPAP) device when you sleep. Unless your health care provider tells you not to, use it when you sleep, day or night. CPAP is a common device used to treat obstructive sleep apnea.  · Talk with your provider before taking any pain medicine, muscle relaxants, or sedatives. Your provider will tell you about the possible dangers of taking these medicines.  © 3060-4328 [employer]. [address] [place] [address], [place] [address]. All rights reserved. This information is not intended as a substitute for professional medical care. Always follow your healthcare professional's instructions.

## 2020-04-09 NOTE — H&P (VIEW-ONLY)
CC:  79-year-old female follows up today with left distal radius fracture left proximal humerus fracture.  On her last visit we had made recommendation for surgery including reverse total shoulder arthroplasty for the proximal humerus fracture and ORIF of the distal radius fracture but, due to the Coronavirus pandemic, the patient did not want to be in the hospital and deferred surgery.  Since our last visit she has apparently taken her splint off multiple times.  She presented this past Monday and repeat x-ray showed further displacement of her distal radius fracture.  She presents today to discuss treatment options.    ROS:    Constitution: Denies chills, fever, and sweats.  HENT: Denies headaches or blurry vision.  Cardiovascular: Denies chest pain or irregular heart beat.  Respiratory: Denies cough or shortness of breath.  Gastrointestinal: Denies abdominal pain, nausea, or vomiting.  Genitourinary:  Denies urinary incontinence, bladder and kidney issues  Musculoskeletal:  Denies muscle cramps.  Positive for left shoulder and left wrist pain  Neurological: Denies dizziness or focal weakness.  Psychiatric/Behavioral: Normal mental status.  Hematologic/Lymphatic: Denies bleeding problem or easy bruising/bleeding.  Skin: Denies rash or suspicious lesions.    Physical examination     Gen - No acute distress, well nourished, well groomed   Eyes - Extraoccular motions intact, pupils equally round and reactive to light and accommodation   ENT - normocephalic, atruamtic, oropharynx clear   Neck - Supple, no abnormal masses   Cardiovascular - regular rate and rhythm   Pulmonary - clear to auscultation bilaterally, no wheezes, ronchi, or rales   Abdomen - soft, non-tender, non-distended, positive bowel sounds   Psych - The patient is alert and oriented x3 with normal mood and affect    Left Upper Extremity Examination     Skin is intact throughout   Motor is intact distally radial, median, ulnar, AIN, PIN   +2 radial and  ulnar pulses   Sensation to light touch is intact distally radial, median, and ulnar   Full ROM at the DIP, PIP, and MCP joints   Wrist shows decreased ROM   Tenderness to palpation noted over the distal radius    Ecchymosis noted over the distal radius  Swelling noted over the distal radius    Triggering of fingers or thumb - negative     Shoulder exam was deferred due to presence of comminuted proximal humerus fracture    X-ray images were examined and personally interpreted by me.  Three views of left wrist dated 04/06/2020 show an intra-articular comminuted displaced fracture of the left distal radius with least 3 fragments.  It has displaced since the previous x-rays on 03/28/2020.    Dx:  Left distal radius fracture with displacement    Plan:  Recommendation was again for open reduction with internal fixation of the left distal radius.  The patient's daughter is with her in the room today.  Again the patient seems slightly confused and does not remember the previous appointment she had with me.  The daughter states she did have a concussion but her mental status has improved over the last week.  We had a long discussion about the risks and benefits of open reduction with internal fixation of the left distal radius.  The mother and daughter both verbalized understanding and states he would like to proceed.  We will schedule that for later on today.    We also discussed treating the 4 part proximal humerus fracture.  I discussed with the patient and her daughter that delaying that surgery for a while will have much of a defect on the outcome.  She needs reverse total shoulder replacement to treat that fracture.  We will treat the outpatient distal radius fracture 1st and then after that is healed address the 4 part proximal humerus fracture down the road and hopefully this Coronavirus pandemic left calm down by that time.

## 2020-04-09 NOTE — OP NOTE
[facility]  Orthopedic Surgery Department  Operative Note    SUMMARY     Date of Procedure: 4/9/2020     Procedure: Procedure(s) (LRB):  ORIF, FRACTURE, RADIUS (Left)     Surgeon(s) and Role:     * Renzo Plata II, MD - Primary    Assisting Surgeon: None    First Assist:  CHECO Wright    Pre-Operative Diagnosis: Other closed intra-articular fracture of distal end of left radius, initial encounter [S52.572A]    Post-Operative Diagnosis: Post-Op Diagnosis Codes:     * Other closed intra-articular fracture of distal end of left radius, initial encounter [S52.572A]    Anesthesia: General    Technical Procedures Used:  Open reduction with internal fixation of an intra-articular fracture with greater than 3 fragments of the left distal radius    Description of the Findings of the Procedure:  Dictated    Significant Surgical Tasks Conducted by the Assistant(s), if Applicable:  Retraction and assistance with maintenance of reduction during plate and screw application, wound closure and splint application    Complications: No    Estimated Blood Loss (EBL): 5 mL           Implants:   Implant Name Type Inv. Item Serial No.  Lot No. LRB No. Used   PLATE COMPRESSION LOCKING - [id number]  PLATE COMPRESSION LOCKING  [employer]ES  Left 1   SCREW STRDRV REC T8 2.7X12 SS - [id number]  SCREW STRDRV REC T8 2.7X12 SS  [employer]ES  Left 2   SCREW VA LOCK STARDRIVE 2.4X12 - [id number]  SCREW VA LOCK STARDRIVE 2.4X12  [employer]  Left 2   SCREW LOCKING 2.4 X 16MM - [id number]  SCREW LOCKING 2.4 X 16MM  [employer]  Left 1   SCREW LOCKING 2.4 X 18MM - [id number]  SCREW LOCKING 2.4 X 18MM  [employer]  Left 3   SCREW LOCKING 2.4 X 20MM - [id number]  SCREW LOCKING 2.4 X 20MM  [employer]ES  Left 1   SCREW 2.4 X 22MM ANGLE RADIUS - [id number]  SCREW 2.4 X 22MM ANGLE RADIUS  [employer]ES  Left 1   SCREW STRDRV REC T8 2.7X12 SS - [id number]  SCREW STRDRV REC T8 2.7X12 SS  SY[employer]ES  Left 1       Specimens:   Specimen (12h ago,  onward)    None                  Condition: Good    Disposition: PACU - hemodynamically stable.    Attestation: I was present for the entire procedure.    Procedure In Detail:  The patient was brought to the operating room and placed on the table in the supine position.  The patient underwent anesthesia with the anesthesia service.  A tourniquet was placed on the left upper extremity and was prepped and draped in the normal sterile fashion.  An Esmarch was used to exsanguinate the limb and the tourniquet was taken up to 250 mm of mercury.  A standard volar approach to the distal radius was performed.  Dissection was taken through skin and subcutaneous tissue down to the flexor carpi radialis tendon sheath.  The sheath was incised and the tendon was reflected radially.  Blunt finger dissection was then taken down to the pronator quadratus.  The pronator was exposed and lifted in a figure 7 flap off of the distal radius to expose fracture line.  Under fluoroscopic visualization a reduction maneuver was performed.  The fracture was noted to split intra-articularly in be made up of at least 3 fragments.  Once provisional reduction was obtained a volar radius distal locking plate from [employer] was was placed over the distal radius.  Provisional fixation was gained with 2 guide K-wires placed through the plate and up into the radial styloid and along the ulnar border of the radius.  Acceptable reduction was verified with fluoroscopy in AP and lateral planes.  We then fired 1 nonlocking screw in the dynamic hole in the shaft of the plate and shaft of the radius.  Under fluoroscopic guidance we then placed the distal locking screws.  After placing the distal locking screws a provisional K-wires were removed.  Two more locking screws were placed in the shaft of the plate.  After all hardware was placed fluoroscopy was used to verify an anatomic reduction and proper placement of all hardware.  Once that was verified the wound  was closed in layered fashion.  A sterile intraoperative dressing was placed and a volar splint was fashioned out of plaster.  Once that hardened the patient was placed back in a sling and swath because of her coexisting proximal humerus fracture.  She was then awakened from anesthesia and taken recovery where she was noted to be stable postoperatively.  Needle and lap counts were correct at the end of the case.

## 2020-04-09 NOTE — PLAN OF CARE
Report to Jessica. Patient denies pain, no nausea present, dressing to left upper extremity dry intact no drainage, arm slinged. Vs stable, patient discharged to home

## 2020-04-09 NOTE — PLAN OF CARE
Patient arrived to pre-op. Warm blankets given.  No complaints. Awaiting surgery.  Will continue to monitor.

## 2020-04-09 NOTE — DISCHARGE SUMMARY
[facility]  Department of Orthopedic Surgery  Discharge Note  Short Stay    Admit Date: 4/9/2020    Discharge Date and Time: No discharge date for patient encounter.     Attending Physician: Renzo Plata II, MD     Discharge Provider: Renzo Plata II    Diagnoses:  Active Hospital Problems    Diagnosis  POA    *Closed fracture of left distal radius [S52.502A]  Yes      Resolved Hospital Problems   No resolved problems to display.       Discharged Condition: good    Hospital Course: Patient was admitted for an outpatient procedure and tolerated the procedure well with no complications.    Final Diagnoses: Same as principal problem.    Disposition: Home or Self Care    Follow up/Patient Instructions:    Medications:  Reconciled Home Medications:      Medication List      CHANGE how you take these medications    * HYDROcodone-acetaminophen 7.5-325 mg per tablet  Commonly known as:  NORCO  Take 1 tablet by mouth every 6 (six) hours as needed for Pain.  What changed:    · Another medication with the same name was added. Make sure you understand how and when to take each.  · Another medication with the same name was removed. Continue taking this medication, and follow the directions you see here.     * HYDROcodone-acetaminophen 7.5-325 mg per tablet  Commonly known as:  NORCO  Take 1 tablet by mouth every 6 (six) hours as needed.  What changed:  You were already taking a medication with the same name, and this prescription was added. Make sure you understand how and when to take each.  Replaces:  HYDROcodone-acetaminophen 5-325 mg per tablet         * This list has 2 medication(s) that are the same as other medications prescribed for you. Read the directions carefully, and ask your doctor or other care provider to review them with you.            CONTINUE taking these medications    aspirin 325 MG tablet  Take 325 mg by mouth daily as needed for Pain.     CALCIUM WITH VITAMIN D 600 mg(1,500mg) -400 unit  Tab  Generic drug:  calcium-vitamin D  Take 1 tablet by mouth once daily.     cyanocobalamin 2000 MCG tablet  Take 2,000 mcg by mouth once daily.     estradioL 0.01 % (0.1 mg/gram) vaginal cream  Commonly known as:  ESTRACE  Apply 1 fingertipful to vaginal area nightly x 2 weeks then every other night x 2 weeks then start using two times a week     ferrous sulfate 325 (65 FE) MG EC tablet  Take 325 mg by mouth 3 (three) times daily with meals.     fish oil-omega-3 fatty acids 300-1,000 mg capsule  Take 2 g by mouth once daily.     multivitamin capsule  Take 1 capsule by mouth once daily.        ASK your doctor about these medications    * HYDROcodone-acetaminophen 5-325 mg per tablet  Commonly known as:  NORCO  Take 1 tablet by mouth every 4 (four) hours as needed for Pain.  Replaced by:  HYDROcodone-acetaminophen 7.5-325 mg per tablet  You also have another medication with the same name that you need to continue taking as instructed.         * This list has 1 medication(s) that are the same as other medications prescribed for you. Read the directions carefully, and ask your doctor or other care provider to review them with you.              Discharge Procedure Orders   Diet Adult Regular     Ice to affected area     Notify your health care provider if you experience any of the following:  temperature >100.4     Notify your health care provider if you experience any of the following:  persistent nausea and vomiting or diarrhea     Notify your health care provider if you experience any of the following:  severe uncontrolled pain     Notify your health care provider if you experience any of the following:  redness, tenderness, or signs of infection (pain, swelling, redness, odor or green/yellow discharge around incision site)     Notify your health care provider if you experience any of the following:  difficulty breathing or increased cough     Notify your health care provider if you experience any of the following:   severe persistent headache     Notify your health care provider if you experience any of the following:  worsening rash     Notify your health care provider if you experience any of the following:  persistent dizziness, light-headedness, or visual disturbances     Notify your health care provider if you experience any of the following:  increased confusion or weakness     Notify your health care provider if you experience any of the following:     Follow-up Information     Renzo Plata II, MD In 2 weeks.    Specialty:  Orthopedic Surgery  Contact information:  [address] [place] [address] [address] Davidson TAPIA [id number] [phone]                   Discharge Procedure Orders (must include Diet, Follow-up, Activity):   Discharge Procedure Orders (must include Diet, Follow-up, Activity)   Diet Adult Regular     Ice to affected area     Notify your health care provider if you experience any of the following:  temperature >100.4     Notify your health care provider if you experience any of the following:  persistent nausea and vomiting or diarrhea     Notify your health care provider if you experience any of the following:  severe uncontrolled pain     Notify your health care provider if you experience any of the following:  redness, tenderness, or signs of infection (pain, swelling, redness, odor or green/yellow discharge around incision site)     Notify your health care provider if you experience any of the following:  difficulty breathing or increased cough     Notify your health care provider if you experience any of the following:  severe persistent headache     Notify your health care provider if you experience any of the following:  worsening rash     Notify your health care provider if you experience any of the following:  persistent dizziness, light-headedness, or visual disturbances     Notify your health care provider if you experience any of the following:  increased confusion or weakness     Notify your health care  provider if you experience any of the following:

## 2020-04-09 NOTE — TRANSFER OF CARE
"Anesthesia Transfer of Care Note    Patient: Joan Josue    Procedure(s) Performed: Procedure(s) (LRB):  ORIF, FRACTURE, RADIUS (Left)    Patient location: PACU    Anesthesia Type: general    Transport from OR: Transported from OR on 2-3 L/min O2 by NC with adequate spontaneous ventilation    Post pain: adequate analgesia    Post assessment: no apparent anesthetic complications and tolerated procedure well    Post vital signs: stable    Level of consciousness: awake, alert and oriented    Nausea/Vomiting: no nausea/vomiting    Complications: none    Transfer of care protocol was followed      Last vitals:   Visit Vitals  BP (!) 159/80 (BP Location: Right arm, Patient Position: Lying)   Pulse 72   Temp 36.8 °C (98.2 °F) (Temporal)   Resp 18   Ht 5' 8.5" (1.74 m)   Wt 56.2 kg (124 lb)   SpO2 99%   Breastfeeding? No   BMI 18.58 kg/m²     "

## 2020-04-09 NOTE — ANESTHESIA POSTPROCEDURE EVALUATION
Anesthesia Post Evaluation    Patient: Joan Josue    Procedure(s) Performed: Procedure(s) (LRB):  ORIF, FRACTURE, RADIUS (Left)    Final Anesthesia Type: general    Patient location during evaluation: PACU  Patient participation: Yes- Able to Participate  Level of consciousness: awake and alert and oriented  Post-procedure vital signs: reviewed and stable  Pain management: adequate  Airway patency: patent    PONV status at discharge: No PONV  Anesthetic complications: no      Cardiovascular status: blood pressure returned to baseline  Respiratory status: unassisted, spontaneous ventilation and room air  Hydration status: euvolemic  Follow-up not needed.          Vitals Value Taken Time   /72 4/9/2020  4:05 PM   Temp 36.6 °C (97.9 °F) 4/9/2020  4:05 PM   Pulse 60 4/9/2020  4:05 PM   Resp 16 4/9/2020  4:05 PM   SpO2 100 % 4/9/2020  4:05 PM         No case tracking events are documented in the log.      Pain/Inder Score: Pain Rating Prior to Med Admin: 5 (4/9/2020  4:20 PM)  Inder Score: 8 (4/9/2020  4:20 PM)

## 2020-04-09 NOTE — PLAN OF CARE
Patient d/c per dr's orders.  Iv removed catheter tip intact.  Patient and daughter verbalized understanding of instructions and education.  Patient wheeled to the car via w/c and left with daughter.

## 2020-04-09 NOTE — OR NURSING
Called patient at 1800 to check on her per daughters request.  Daughter said Joan Josue is doing good.  She is still having some pain but is tolerating it.  She is having keep her arm elevated with sling to help reduce swelling.  Gave patient number to Dr. Plata's office, [facility]  INformed her also to go to the ER for any issues.

## 2020-04-09 NOTE — OR NURSING
Patient is c/o of still having pain.  Informed Dr. Fairbanks with anesthesia of medications already given and that patient is a little sleepy.  He said ok to d/c patient home. No new orders given.

## 2020-04-13 ENCOUNTER — TELEPHONE (OUTPATIENT)
Dept: ORTHOPEDICS | Facility: CLINIC | Age: 80
End: 2020-04-13

## 2020-04-22 DIAGNOSIS — S42.292A CLOSED 4-PART FRACTURE OF PROXIMAL HUMERUS, LEFT, INITIAL ENCOUNTER: Primary | ICD-10-CM

## 2020-04-22 DIAGNOSIS — S52.572A OTHER CLOSED INTRA-ARTICULAR FRACTURE OF DISTAL END OF LEFT RADIUS, INITIAL ENCOUNTER: ICD-10-CM

## 2020-04-28 ENCOUNTER — OFFICE VISIT (OUTPATIENT)
Dept: ORTHOPEDICS | Facility: CLINIC | Age: 80
End: 2020-04-28
Payer: MEDICARE

## 2020-04-28 ENCOUNTER — HOSPITAL ENCOUNTER (OUTPATIENT)
Dept: RADIOLOGY | Facility: HOSPITAL | Age: 80
Discharge: HOME | End: 2020-04-28
Attending: ORTHOPAEDIC SURGERY
Payer: MEDICARE

## 2020-04-28 VITALS — WEIGHT: 124 LBS | HEIGHT: 69 IN | RESPIRATION RATE: 16 BRPM | BODY MASS INDEX: 18.37 KG/M2

## 2020-04-28 DIAGNOSIS — S52.572D OTHER CLOSED INTRA-ARTICULAR FRACTURE OF DISTAL END OF LEFT RADIUS WITH ROUTINE HEALING, SUBSEQUENT ENCOUNTER: Primary | ICD-10-CM

## 2020-04-28 DIAGNOSIS — S52.572A OTHER CLOSED INTRA-ARTICULAR FRACTURE OF DISTAL END OF LEFT RADIUS, INITIAL ENCOUNTER: ICD-10-CM

## 2020-04-28 DIAGNOSIS — S42.292A CLOSED 4-PART FRACTURE OF PROXIMAL HUMERUS, LEFT, INITIAL ENCOUNTER: ICD-10-CM

## 2020-04-28 PROCEDURE — 73110 XR WRIST COMPLETE 3 VIEWS LEFT: ICD-10-PCS | Mod: 26,LT,, | Performed by: RADIOLOGY

## 2020-04-28 PROCEDURE — 99999 PR PBB SHADOW E&M-EST. PATIENT-LVL III: CPT | Mod: PBBFAC,,, | Performed by: ORTHOPAEDIC SURGERY

## 2020-04-28 PROCEDURE — 99024 PR POST-OP FOLLOW-UP VISIT: ICD-10-PCS | Mod: S$GLB,,, | Performed by: ORTHOPAEDIC SURGERY

## 2020-04-28 PROCEDURE — 73110 X-RAY EXAM OF WRIST: CPT | Mod: 26,LT,, | Performed by: RADIOLOGY

## 2020-04-28 PROCEDURE — 99999 PR PBB SHADOW E&M-EST. PATIENT-LVL III: ICD-10-PCS | Mod: PBBFAC,,, | Performed by: ORTHOPAEDIC SURGERY

## 2020-04-28 PROCEDURE — 73110 X-RAY EXAM OF WRIST: CPT | Mod: TC,PN,LT

## 2020-04-28 PROCEDURE — 99024 POSTOP FOLLOW-UP VISIT: CPT | Mod: S$GLB,,, | Performed by: ORTHOPAEDIC SURGERY

## 2020-04-28 NOTE — PROGRESS NOTES
CC:  79-year-old female follows up status post ORIF of her left distal radius.  Date of surgery was 04/09/2020.  Overall with regards to her wrist she is doing well and has no complaints today.    She her daughter reports that they did see Dr. Long at [facility] regarding the shoulder.  They state they are going to follow up with him for further treatment for the shoulder.    Left Upper Extremity Examination     Skin is intact throughout, incision is healing well with no sign of infection.  Suture line is intact.    Motor is intact distally radial, median, ulnar, AIN, PIN   +2 radial and ulnar pulses   Sensation to light touch is intact distally radial, median, and ulnar   Decreased ROM at the DIP, PIP, and MCP joints secondary to swelling  Wrist shows decreased ROM   No tenderness to palpation noted     Ecchymosis noted over the distal radius that appears to be resolving  Swelling noted of the wrist and hand    Triggering of fingers or thumb - negative    X-ray images were examined and personally interpreted by me.  Three views of the left wrist dated 04/28/2020 available for interpretation.  There is plate and screw fixation of a distal radius fracture.  Hardware is well fixed with no evidence of loosening.  Reduction is near anatomic.    Dx:  Status post ORIF of a left distal radius fracture, stable    Plan:  Sutures removed and Steri-Strips applied.  We placed the patient in a short-arm cast because she has some confusion and previously took her splint off multiple times.  We will refer her to OT for range of motion of her fingers.  I discussed with the patient and the daughter that she should not  anything heavier than a cup of coffee or can of soda.  A short-arm fiberglass cast was applied.  Follow-up in 4 weeks with x-rays out of cast.    They will follow up with Dr. Long for the treatment of her shoulder.

## 2020-04-30 ENCOUNTER — CLINICAL SUPPORT (OUTPATIENT)
Dept: REHABILITATION | Facility: HOSPITAL | Age: 80
End: 2020-04-30
Attending: ORTHOPAEDIC SURGERY
Payer: MEDICARE

## 2020-04-30 DIAGNOSIS — M25.542 PAIN IN JOINT OF LEFT HAND: ICD-10-CM

## 2020-04-30 DIAGNOSIS — M25.642 STIFFNESS OF LEFT HAND JOINT: Primary | ICD-10-CM

## 2020-04-30 DIAGNOSIS — R29.898 LEFT HAND WEAKNESS: ICD-10-CM

## 2020-04-30 DIAGNOSIS — M25.442 EFFUSION OF JOINT OF LEFT HAND: ICD-10-CM

## 2020-04-30 DIAGNOSIS — S52.572D OTHER CLOSED INTRA-ARTICULAR FRACTURE OF DISTAL END OF LEFT RADIUS WITH ROUTINE HEALING, SUBSEQUENT ENCOUNTER: ICD-10-CM

## 2020-04-30 PROCEDURE — 97110 THERAPEUTIC EXERCISES: CPT | Mod: PN

## 2020-04-30 PROCEDURE — 97165 OT EVAL LOW COMPLEX 30 MIN: CPT | Mod: PN

## 2020-04-30 NOTE — PATIENT INSTRUCTIONS
home program update 4-30-20  -do below exercises 10 times, 6 x day; medium speed, medium force        *do exercise 2 and 3 on index, long, ring, and small one at a time

## 2020-04-30 NOTE — PLAN OF CARE
[facility] Occupational Therapy  Initial Evaluation     Date: 4/30/2020  Name: Joan Josue  Clinic Number: [id number]    Therapy Diagnosis:   Encounter Diagnoses   Name Primary?    Other closed intra-articular fracture of distal end of left radius with routine healing, subsequent encounter     Stiffness of left hand joint Yes    Pain in joint of left hand     Effusion of joint of left hand     Left hand weakness      Physician: Renzo Plata II, MD    Physician Orders: evaluate and tx (last ortho note wants ROM of digits)  Medical Diagnosis: left intraarticular distal radius fx  Surgical Procedure and Date: left intraarticular distal radius fx s/p ORIF, 4-9-20  Evaluation Date: 4/30/2020  Insurance Authorization Period Expiration: 4-28-20 thru 6-30-20  Plan of Care Certification Period: 4-29-20 thru 6-10-20  Date of Return to MD: see [facility]    Visit # / Visits authorized: 1 / 1  Time In:2  Time Out: 245  Total Billable Time: 30 minutes    Precautions:  Universal  Subjective     Involved Side: left  Dominant Side: right   Date of Onset: 3-28-20  History of Current Condition/Mechanism of Injury: fell off roof, er same day, xrayed and immobilized, saw referring md who has treated via immobilzation (had some issues with splint compliance so switched to cast), recently sent here; also wa evaluated by referring md as well as a different md for left shoulder injury sustained on same day as left distal radius fx  Imaging: xray  Previous Therapy: none    Past Medical History/Physical Systems Review:   Joan Josue  has a past medical history of Anemia, Anemia due to multiple mechanisms, Hypercholesteremia, Iron deficiency anemia, Osteoporosis, postmenopausal, Poliomyelitis, Vitamin B12 deficiency anemia, and Vitamin D deficiency disease.    Joan Josue  has a past surgical history that includes TONSILLECTOMY, ADENOIDECTOMY; IUD removed; Tonsillectomy; and Open reduction and internal  fixation (ORIF) of fracture of radius (Left, 4/9/2020).    Joan has a current medication list which includes the following prescription(s): aspirin, calcium-vitamin d, cyanocobalamin, estradiol, ferric carboxymaltose, ferrous sulfate, fish oil-omega-3 fatty acids, hydrocodone-acetaminophen, and multivitamin.    Review of patient's allergies indicates:   Allergen Reactions    Corticosteroids (glucocorticoids)      Tachycardia      Prednisone      Other reaction(s): tachycardia  Other reaction(s): muscle spasm        Patient's Goals for Therapy: full painless use    Pain:  Functional Pain Scale Rating 0-10:   4/10 on average  0/10 at best  9/10 at worst  Location: diffuse thru digits  Description: ache  Aggravating Factors: arom  Easing Factors: rest  Occupation:  retired  Working presently: no  Duties: Normal self and home care tasks    Functional Limitations/Social History:    Previous functional status includes: Independent with all ADLs.     Current Functional Status   Home/Living environment : lives with nobody yet daughter with patient for now    Limitation of Functional Status as follows: severe decrease in functional use left UE due to sling and abd pillow donned and short arm cast as well as stiff digits   ADLs/IADLs:               Needs significant help with all required tasks   Leisure: not tested  pain meds: denies  nicotine: denies  caffeine: drinks 1 cup of coffee daily    Objective   Posture:severe pitting edema in exposed handnt  Palpation:nt  Sensation:intermittent numbness thumb pulp  ROM: arom thumb mcp 10/nt, ip 10/30                Index             Long           Ring          small  mcp      10/45              30/50           15/45       15/45  Pip        10/30             20/40            20/60       10/50  Dip        10/10             0/10             0/20           10/20         Edema:circumferential     mcp right 14.3 cm left 21.0        CMS Impairment/Limitation/Restriction for FOTO   Survey    Therapist reviewed FOTO scores for Joan Josue on 4/30/2020.   FOTO documents entered into [employer] - see Media section.    Limitation Score: 96%  Category: Carrying    Current : CM = at least 80% but < 100% impaired limited or restricted  Goal: CK = at least 40% but < 60% impaired, limited or restricted               Treatment     Treatment Time In: 215  Treatment Time Out: 245  Total Treatment time separate from Evaluation time:30      Joan received therapeutic exercises for 30 minutes including:  -see above          Home Exercise Program/Education:  Issued HEP (see patient instructions in EMR) . Exercises were reviewed and Joan was able to demonstrate them prior to the end of the session.   Pt received a written copy of exercises to perform at home. Joan demonstrated good understanding of the education provided.  Pt was advised to perform these exercises free of pain, and to stop performing them if pain occurs.    Patient/Family Education: role of OT, goals for OT, scheduling/cancellations - pt verbalized understanding. Discussed insurance limitations with patient.    Additional Education provided: likely tx progression, expectations of rehab    Assessment     Joan Josue is a 79 y.o. female referred to outpatient occupational therapy and presents with a medical diagnosis of see above, resulting in Decreased ROM, Decreased muscle strength, Decreased functional hand use, Increased pain, Edema, Joint Stiffness and Scar Adhesions and demonstrates limitations as described in the chart below. Following medical record review it is determined that pt will benefit from occupational therapy services in order to maximize pain free and/or functional use of left arm. The following goals were discussed with the patient and patient is in agreement with them as to be addressed in the treatment plan. The patient's rehab potential is good.     Anticipated barriers to occupational therapy: edema,  scar, pain, stiffness, weakness, need for education on therapy rationale and likely progression, duration of symptoms prior to formal rehabilitation  Pt has no cultural, educational or language barriers to learning provided.    Profile and History Assessment of Occupational Performance Level of Clinical Decision Making Complexity Score   Occupational Profile:   Joan Josue is a 79 y.o. female who lives alone and is retired Joan Josue has difficulty with  ADLs and IADLs as listed previously, which  affecting his/her daily functional abilities.      Comorbidities:    has a past medical history of Anemia, Anemia due to multiple mechanisms, Hypercholesteremia, Iron deficiency anemia, Osteoporosis, postmenopausal, Poliomyelitis, Vitamin B12 deficiency anemia, and Vitamin D deficiency disease.    Medical and Therapy History Review:   Brief               Performance Deficits    Physical:  Joint Mobility  Muscle Power/Strength  Skin Integrity/Scar Formation  Edema  pain    Cognitive:  No Deficits    Psychosocial:    No Deficits     Clinical Decision Making:  low    Assessment Process:  Problem-Focused Assessments    Modification/Need for Assistance:  Not Necessary    Intervention Selection:  Limited Treatment Options       low  Based on PMHX, co morbidities , data from assessments and functional level of assistance required with task and clinical presentation directly impacting function.           The following goals were discussed with the patient and patient is in agreement with them as to be addressed in the treatment plan.     Goals:   stg to be met in 3 weeks 1. Patient will be I with HEP 2. Patient will have 2/10 pain with light use 3. Patient will have = prom of bilateral digits 1 thru 5  to enhance affected arm use with ADL      ltg to be met by d/c 1. Patient will be I with d/c HEP 2. Patient will have 1/10 pain with all use 3. Patient will have about 75% /pinch  on affected side vs unaffected side  to promote full functional use                                                                      4. Patient will be I with all ADL      all goals ongoing unless otherwise noted      Plan   Certification Period/Plan of care expiration: 4/30/2020 to 6-10-20.    Outpatient Occupational Therapy 1 times weekly for 6 weeks to include the following interventions: eval and tx; consider increasing visit frequency if ortho writes orders on forearm and wrist once cast removed    Above frequency and duration in above dates may change based on patient progress and need for therapy    Donovan HENNESSY CHT

## 2020-05-06 ENCOUNTER — CLINICAL SUPPORT (OUTPATIENT)
Dept: REHABILITATION | Facility: HOSPITAL | Age: 80
End: 2020-05-06
Attending: ORTHOPAEDIC SURGERY
Payer: MEDICARE

## 2020-05-06 DIAGNOSIS — M25.542 PAIN IN JOINT OF LEFT HAND: ICD-10-CM

## 2020-05-06 DIAGNOSIS — M25.442 EFFUSION OF JOINT OF LEFT HAND: ICD-10-CM

## 2020-05-06 DIAGNOSIS — R29.898 LEFT HAND WEAKNESS: ICD-10-CM

## 2020-05-06 DIAGNOSIS — M25.642 STIFFNESS OF LEFT HAND JOINT: Primary | ICD-10-CM

## 2020-05-06 PROCEDURE — 97110 THERAPEUTIC EXERCISES: CPT | Mod: PN

## 2020-05-06 NOTE — PROGRESS NOTES
Occupational Therapy Daily Treatment Note     Date: 5/6/2020  Name: Joan Josue  Clinic Number: [id number]    Therapy Diagnosis:   Encounter Diagnoses   Name Primary?    Stiffness of left hand joint Yes    Pain in joint of left hand     Effusion of joint of left hand     Left hand weakness      Physician: Renzo Plata II, MD    Physician Orders: evaluate and tx (last ortho note wants ROM of digits)  Medical Diagnosis: left intraarticular distal radius fx  Surgical Procedure and Date: left intraarticular distal radius fx s/p ORIF, 4-9-20  Evaluation Date: 4/30/2020  Insurance Authorization Period Expiration: referral # [id number] 4-30-20 thru 7-1-20  Plan of Care Certification Period: 4-29-20 thru 6-10-20  Date of Return to MD: see eic    Visit # / Visits authorized: 1 / 12 referral # [id number] 4-30-20 thru 7-1-20  Time In:3  Time Out: 345  Total Billable Time: 45 minutes    Precautions:  universal    Subjective     Pt reports: no new issues, patient and daughter understand HEP as of today's visit should be sufficient to restore ROM while in cast  she is compliant with home exercise program.  Response to previous treatment:good  Functional change: light use of hand increasing    Pain: 0/10 rest; 3/10 light use  Location: diffuse digits      ache  Objective       Timed units:  3 therapeutic exercise                            Time:3-345                                Measurements:     Prom thumb ip ext/flex 0/70    Full passive ext index thru small, intrinsic stretch full index thru small with mild tension at end range, composite flex to cast index thru small        Fluido: n/a  U/s:n/a      UBE: n/a    Scar massage: n/a    Stretches as tolerated-pain free: intrinsic index thru small      Manual: n/a    ROM: arom per 1st day's HEP    PRE: n/a      HEP: see below    Home Exercises and Education Provided     Education provided:   Explained we will resume OT if ortho writes new order at next ortho visit  (assuming ROM allowed at wrist and forearm)      progress towards goals   likely tx progression  rationale of rehab interventions    Written Home Exercises Provided: yes  Previously issued exercises were reviewed if still part of current tx plan as well as any issued today and Joan was able to demonstrate them prior to the end of the session.  Joan demonstrated good understanding of the HEP provided.     See EMR under patient instructions and/or media for HEP issued today or in past    Assessment     Good reduction in edema since last visit, has dried skin throughout hand, daughter and patient look to understand tx rationale and likely rehab progression    Pt would continue to benefit from skilled OT in order to facilitate full use and proper mechaincs.    Joan is progressing well towards her goals and there are no updates to goals at this time. Pt prognosis is good.  Pt will continue to benefit from skilled outpatient occupational therapy to address the deficits listed in the problem list on initial evaluation to provide pt/family education and to maximize pt's level of independence in the home and community environment.     Anticipated barriers to occupational therapy: edema, pain, scar, stiffness, weakness, need for proper education on tx progression    Pt's spiritual, cultural and educational needs considered and pt agreeable to plan of care and goals.    Goals:  stg to be met in 3 weeks 1. Patient will be I with HEP 2. Patient will have 2/10 pain with light use 3. Patient will have = prom of bilateral digits 1 thru 5  to enhance affected arm use with ADL        ltg to be met by d/c 1. Patient will be I with d/c HEP 2. Patient will have 1/10 pain with all use 3. Patient will have about 75% /pinch  on affected side vs unaffected side to promote full functional use                                                                      4. Patient will be I with all ADL       all goals ongoing unless  otherwise noted              Any goals met today ?  (if any met see above)    Updates/Grading for next session: prn    Plan: see above; edema control, scar management, ROM, PRE, patient education, prn tx      Donovan Bryant, OT /CHT        Outpatient Therapy Discharge Summary     Name: Joan Josue  Clinic Number: [id number]    Therapy Diagnosis:   Encounter Diagnoses   Name Primary?    Stiffness of left hand joint Yes    Pain in joint of left hand     Effusion of joint of left hand     Left hand weakness      Physician: Renzo Plata II, MD    Physician Orders: evaluate and tx  Medical Diagnosis: see evaluation  Evaluation Date: 4-30-20      Date of Last visit: 5-6-20  Total Visits Received: 2  Cancelled Visits: see epic  No Show Visits: see epic    Assessment    Goals: see today's note    Discharge reason: Patient has reached the maximum rehab potential for the present time    Plan   This patient is discharged from Occupational Therapy

## 2020-05-06 NOTE — PATIENT INSTRUCTIONS
home program update 5-6-20  -apply vitamin e lotion to skin at least 2 x day  -do below stretch 2 x day; do index, long, ring, and small one at a time; do 10 on each finger, hold 30 seconds

## 2020-05-12 DIAGNOSIS — S52.572D OTHER CLOSED INTRA-ARTICULAR FRACTURE OF DISTAL END OF LEFT RADIUS WITH ROUTINE HEALING, SUBSEQUENT ENCOUNTER: Primary | ICD-10-CM

## 2020-05-26 ENCOUNTER — OFFICE VISIT (OUTPATIENT)
Dept: ORTHOPEDICS | Facility: CLINIC | Age: 80
End: 2020-05-26
Payer: MEDICARE

## 2020-05-26 ENCOUNTER — HOSPITAL ENCOUNTER (OUTPATIENT)
Dept: RADIOLOGY | Facility: HOSPITAL | Age: 80
Discharge: HOME | End: 2020-05-26
Attending: ORTHOPAEDIC SURGERY
Payer: MEDICARE

## 2020-05-26 VITALS — HEIGHT: 69 IN | WEIGHT: 124 LBS | RESPIRATION RATE: 16 BRPM | TEMPERATURE: 99 F | BODY MASS INDEX: 18.37 KG/M2

## 2020-05-26 DIAGNOSIS — S52.572D OTHER CLOSED INTRA-ARTICULAR FRACTURE OF DISTAL END OF LEFT RADIUS WITH ROUTINE HEALING, SUBSEQUENT ENCOUNTER: Primary | ICD-10-CM

## 2020-05-26 DIAGNOSIS — S42.292A CLOSED 4-PART FRACTURE OF PROXIMAL HUMERUS, LEFT, INITIAL ENCOUNTER: ICD-10-CM

## 2020-05-26 DIAGNOSIS — S42.292D FRACTURE, HUMERUS, HEAD, LEFT, WITH ROUTINE HEALING, SUBSEQUENT ENCOUNTER: ICD-10-CM

## 2020-05-26 DIAGNOSIS — S52.572D OTHER CLOSED INTRA-ARTICULAR FRACTURE OF DISTAL END OF LEFT RADIUS WITH ROUTINE HEALING, SUBSEQUENT ENCOUNTER: ICD-10-CM

## 2020-05-26 DIAGNOSIS — S42.292A CLOSED 4-PART FRACTURE OF PROXIMAL HUMERUS, LEFT, INITIAL ENCOUNTER: Primary | ICD-10-CM

## 2020-05-26 PROCEDURE — 99024 POSTOP FOLLOW-UP VISIT: CPT | Mod: S$GLB,,, | Performed by: ORTHOPAEDIC SURGERY

## 2020-05-26 PROCEDURE — 99999 PR PBB SHADOW E&M-EST. PATIENT-LVL III: ICD-10-PCS | Mod: PBBFAC,,, | Performed by: ORTHOPAEDIC SURGERY

## 2020-05-26 PROCEDURE — 73110 X-RAY EXAM OF WRIST: CPT | Mod: TC,PN,LT

## 2020-05-26 PROCEDURE — 73060 XR HUMERUS 2 VIEW LEFT: ICD-10-PCS | Mod: 26,LT,, | Performed by: RADIOLOGY

## 2020-05-26 PROCEDURE — 73110 X-RAY EXAM OF WRIST: CPT | Mod: 26,LT,, | Performed by: RADIOLOGY

## 2020-05-26 PROCEDURE — 73060 X-RAY EXAM OF HUMERUS: CPT | Mod: 26,LT,, | Performed by: RADIOLOGY

## 2020-05-26 PROCEDURE — 99024 PR POST-OP FOLLOW-UP VISIT: ICD-10-PCS | Mod: S$GLB,,, | Performed by: ORTHOPAEDIC SURGERY

## 2020-05-26 PROCEDURE — 99999 PR PBB SHADOW E&M-EST. PATIENT-LVL III: CPT | Mod: PBBFAC,,, | Performed by: ORTHOPAEDIC SURGERY

## 2020-05-26 PROCEDURE — 73110 XR WRIST COMPLETE 3 VIEWS LEFT: ICD-10-PCS | Mod: 26,LT,, | Performed by: RADIOLOGY

## 2020-05-26 PROCEDURE — 73060 X-RAY EXAM OF HUMERUS: CPT | Mod: TC,PN,LT

## 2020-05-26 NOTE — PROGRESS NOTES
CC:  79-year-old female follows up status post ORIF of left distal radius.  Date of surgery was 04/09/2020.  She also has a left proximal humerus fracture that she declined surgery on.    Left Upper Extremity Examination     Skin is intact throughout, incision of the distal radius healing well with no sign of infection.    Motor is intact distally radial, median, ulnar, AIN, PIN   +2 radial and ulnar pulses   Sensation to light touch is intact distally radial, median, and ulnar   Full ROM at the DIP, PIP, and MCP joints   Wrist shows decreased ROM, full pronation noted but supination is restricted   No tenderness to palpation noted     Carpal Tunnel compression test - negative   Phalen's Test - negative  Tinel's Test - negative  Finkelstien's Test - negative    No Ecchymosis noted   Swelling noted of the hand and fingers    Triggering of fingers or thumb - negative    X-ray images were examined and personally interpreted by me.  Three views of the left wrist dated 05/26/2020 show a healed distal radius fracture with plate screw fixation that is in good alignment and well fixed.  Three views of the left shoulder dated 05/26/2020 show a healing proximal humerus fracture.  The humeral head is well reduced in the glenoid.  Overall alignment is acceptable.  Callus formation noted.    Dx:  Status post ORIF of the left distal radius, stable and healing.  Left proximal humerus fracture also stable and healing    Plan:  At this point we will transition to a removable splint for the distal radius.  We will make a referral to physical therapy for both the wrist and the shoulder.  Follow-up in 1 month.

## 2020-05-27 ENCOUNTER — TELEPHONE (OUTPATIENT)
Dept: ORTHOPEDICS | Facility: CLINIC | Age: 80
End: 2020-05-27

## 2020-05-27 NOTE — TELEPHONE ENCOUNTER
Contacted patient. Swelling was discussed when cast was removed yesterday. Ice/massage/elevation. Will have OT work on massage as well. Fernanda Jorgensen LPN

## 2020-05-27 NOTE — TELEPHONE ENCOUNTER
----- Message from Anna Jhaveri MA sent at 5/27/2020  8:03 AM CDT -----  Contact: self/ [phone]  PT she had her cast removed on yesterday and now her hand is swollen badly. Pt is requesting to speak with someone asap.

## 2020-05-28 ENCOUNTER — TELEPHONE (OUTPATIENT)
Dept: ORTHOPEDICS | Facility: CLINIC | Age: 80
End: 2020-05-28

## 2020-05-28 NOTE — TELEPHONE ENCOUNTER
----- Message from Anna Jhaveri MA sent at 5/28/2020  9:48 AM CDT -----  Contact: self/ [phone]  PT is requesting to see if she can come in today or tomorrow for  Occupational Therapy

## 2020-05-28 NOTE — TELEPHONE ENCOUNTER
Spoke with patient. Advised patient to contact therapy to change her appointment day and time. Patient verbalized understanding.

## 2020-05-28 NOTE — TELEPHONE ENCOUNTER
----- Message from Gricel Gallo MA sent at 5/28/2020  9:12 AM CDT -----  Contact: patient  Type: Needs Medical Advice  Who Called:  Joan Jones Call Back Number:   Additional Information: patient would like to change her therapy appointment to today and tomorrow.  Please call to discuss

## 2020-06-01 ENCOUNTER — CLINICAL SUPPORT (OUTPATIENT)
Dept: REHABILITATION | Facility: HOSPITAL | Age: 80
End: 2020-06-01
Attending: ORTHOPAEDIC SURGERY
Payer: MEDICARE

## 2020-06-01 DIAGNOSIS — R29.898 LEFT HAND WEAKNESS: ICD-10-CM

## 2020-06-01 DIAGNOSIS — R29.898 LEFT ARM WEAKNESS: ICD-10-CM

## 2020-06-01 DIAGNOSIS — M25.642 STIFFNESS OF LEFT HAND JOINT: ICD-10-CM

## 2020-06-01 DIAGNOSIS — M25.512 LEFT SHOULDER PAIN, UNSPECIFIED CHRONICITY: ICD-10-CM

## 2020-06-01 DIAGNOSIS — S42.292A CLOSED 4-PART FRACTURE OF PROXIMAL HUMERUS, LEFT, INITIAL ENCOUNTER: ICD-10-CM

## 2020-06-01 DIAGNOSIS — M25.542 PAIN IN JOINT OF LEFT HAND: ICD-10-CM

## 2020-06-01 DIAGNOSIS — M25.612 SHOULDER STIFFNESS, LEFT: Primary | ICD-10-CM

## 2020-06-01 DIAGNOSIS — M25.442 EFFUSION OF JOINT OF LEFT HAND: ICD-10-CM

## 2020-06-01 PROCEDURE — 97165 OT EVAL LOW COMPLEX 30 MIN: CPT | Mod: PN

## 2020-06-01 PROCEDURE — 97110 THERAPEUTIC EXERCISES: CPT | Mod: PN

## 2020-06-01 NOTE — PATIENT INSTRUCTIONS
home program 6-1-20  -gently massage scar with lotion at least 5 minutes 4 x day  -use left arm for very light painless use only (dressing, hygiene, etc.)

## 2020-06-01 NOTE — PLAN OF CARE
[facility] Occupational Therapy  Initial Evaluation     Date: 6/1/2020  Name: Joan Josue  Clinic Number: [id number]    Therapy Diagnosis:   Encounter Diagnoses   Name Primary?    Closed 4-part fracture of proximal humerus, left, initial encounter     Shoulder stiffness, left Yes    Left arm weakness     Stiffness of left hand joint     Pain in joint of left hand     Effusion of joint of left hand     Left hand weakness     Left shoulder pain, unspecified chronicity      Physician: Renzo Plata II, MD    Physician Orders: evaluate and tx  Medical Diagnosis: left distal radius fx, proximal humeral fx  Surgical Procedure and Date: left distal radius fx ORIF, 4-9-20  Evaluation Date: 6/1/2020  Insurance Authorization Period Expiration:referral# [id number] 5-29-20 thru 7-31-20  Plan of Care Certification Period: 6-1-20 thru 7-13-20  Date of Return to MD: see epic    Visit # / Visits authorized: 1 / 20  Time In:130  Time Out: 215  Total Billable Time: 15 minutes    Precautions:  Universal, see epic  Subjective     Involved Side: left  Dominant Side: right   Date of Onset: 3-28-20  History of Current Condition/Mechanism of Injury: fell off roof, er same day, injuries treated via immobilization, recently sent here for OT  Imaging: xray  Previous Therapy: left digital rom in cast soon post surgery    Past Medical History/Physical Systems Review:   Joan Josue  has a past medical history of Anemia, Anemia due to multiple mechanisms, Hypercholesteremia, Iron deficiency anemia, Osteoporosis, postmenopausal, Poliomyelitis, Vitamin B12 deficiency anemia, and Vitamin D deficiency disease.    Joan Josue  has a past surgical history that includes TONSILLECTOMY, ADENOIDECTOMY; IUD removed; Tonsillectomy; and Open reduction and internal fixation (ORIF) of fracture of radius (Left, 4/9/2020).    Joan has a current medication list which includes the following prescription(s): aspirin,  "calcium-vitamin d, cyanocobalamin, estradiol, ferric carboxymaltose, ferrous sulfate, fish oil-omega-3 fatty acids, hydrocodone-acetaminophen, and multivitamin.    Review of patient's allergies indicates:   Allergen Reactions    Corticosteroids (glucocorticoids)      Tachycardia      Prednisone      Other reaction(s): tachycardia  Other reaction(s): muscle spasm        Patient's Goals for Therapy: full painless use    Pain:  Functional Pain Scale Rating 0-10:   3/10 on average  2//10 at best  6/10 at worst  Location: diffuse thru left arm  Description: ache  Aggravating Factors: use  Easing Factors: rest  Occupation:  retired  Working presently: no  Duties: Normal self and home care tasks    Functional Limitations/Social History:    Previous functional status includes: Independent with all ADLs.     Current Functional Status   Home/Living environment : lives with nobody yet says daughter has been visiting from out of town off and on since injury date    Limitation of Functional Status as follows: severe decrease in use with all required tasks   ADLs/IADLs:               See above   Leisure: not tested  pain meds: denies  nicotine: denies  caffeine: one cup of coffee daily    Objective   Posture:visually edematous hand, healed incision per surgery, elbow with slightly flexed posture at rest  Palpation:nt  Sensation:denies burning, tingling, numbness  ROM:  r prom er at 0 abd 80   at 45 abd 90    at 90 abd 90;       sidelying ir  60        ir behind back 14"    lift off  l prom er at 0 abd 30    at 45 abd 40    at  90 abd nt since abd to 80;      sidelying ir 20         ir behind back belt    Prom                      Right             Left   Elbow ext/flex       0/150             30/130  Sup/pro                90/90              20/20  Df/pf                     80/90              20/20  Rd/ud                   20/40             20/40  Pa                        80                   60  ra                        80   "                  64  Retroposition     Full                  Full   Opposition        dpc                  1 cm                                         IF                        LF                                      RF                   SF  Composite flex        2 cm to dpc               2                                        1                   0 with mild tension  Intrinsic stretch       0 cm to A1                0 with mild tension             1                    0 with mild tension  Composite ext        Full                            Full                                  Full                    full                      Edema:circumferential     nt        CMS Impairment/Limitation/Restriction for FOTO  Survey    Therapist reviewed FOTO scores for Joan Josue on 6/1/2020.   FOTO documents entered into [employer] - see Media section.    Limitation Score: 96%  Category: Carrying    Current : CK = at least 40% but < 60% impaired, limited or restricted  Goal: CK = at least 40% but < 60% impaired, limited or restricted               Treatment     Treatment Time In: 2  Treatment Time Out: 215  Total Treatment time separate from Evaluation time:15      Joan received therapeutic exercises for 15 minutes including:    See emr      Home Exercise Program/Education:  Issued HEP (see patient instructions in EMR) . Exercises were reviewed and Joan was able to demonstrate them prior to the end of the session.   Pt received a written copy of exercises to perform at home. Joan demonstrated good understanding of the education provided.  Pt was advised to perform these exercises free of pain, and to stop performing them if pain occurs.    Patient/Family Education: role of OT, goals for OT, scheduling/cancellations - pt verbalized understanding. Discussed insurance limitations with patient.    Additional Education provided: likely tx progression, expectations of rehab    Assessment     Joan Josue is a 79  y.o. female referred to outpatient occupational therapy and presents with a medical diagnosis of see above, resulting in Decreased ROM, Decreased muscle strength, Decreased functional hand use, Increased pain, Edema, Joint Stiffness and Scar Adhesions and demonstrates limitations as described in the chart below. Following medical record review it is determined that pt will benefit from occupational therapy services in order to maximize pain free and/or functional use of left arm. The following goals were discussed with the patient and patient is in agreement with them as to be addressed in the treatment plan. The patient's rehab potential is good.     Anticipated barriers to occupational therapy: edema, scar, pain, stiffness, weakness, need for education on therapy rationale and likely progression, duration of symptoms prior to formal rehabilitation  Pt has no cultural, educational or language barriers to learning provided.    Profile and History Assessment of Occupational Performance Level of Clinical Decision Making Complexity Score   Occupational Profile:   Joan Josue is a 79 y.o. female who lives alone and is retired Joan Josue has difficulty with  ADLs and IADLs as listed previously, which  affecting his/her daily functional abilities.      Comorbidities:    has a past medical history of Anemia, Anemia due to multiple mechanisms, Hypercholesteremia, Iron deficiency anemia, Osteoporosis, postmenopausal, Poliomyelitis, Vitamin B12 deficiency anemia, and Vitamin D deficiency disease.    Medical and Therapy History Review:   Brief               Performance Deficits    Physical:  No Deficits    Cognitive:  Memory    Psychosocial:    No Deficits     Clinical Decision Making:  low    Assessment Process:  Problem-Focused Assessments    Modification/Need for Assistance:  Not Necessary    Intervention Selection:  Limited Treatment Options       low  Based on PMHX, co morbidities , data from assessments and  functional level of assistance required with task and clinical presentation directly impacting function.           The following goals were discussed with the patient and patient is in agreement with them as to be addressed in the treatment plan.     Goals:   stg to be met in 3 weeks 1. Patient will be I with HEP 2. Patient will have 2/10 pain with light use 3. Patient will have = prom of bilateral arms to enhance affected arm use with ADL      ltg to be met by d/c 1. Patient will be I with d/c HEP 2. Patient will have 1/10 pain with all use 3. Patient will have about 75% /pinch  on affected side vs unaffected side to promote full functional use  goal                                                                     4. Patient will be I with all ADL  5. Patient will have about 3+/5 MMT elevation left arm to improve use with ADL    all goals ongoing unless otherwise noted      Plan   Certification Period/Plan of care expiration: 6/1/2020 to 7-13-20.    Outpatient Occupational Therapy 2 times weekly for 6 weeks to include the following interventions: eval and tx    Above frequency and duration in above dates may change based on patient progress and need for therapy    Donovan HENNESSY CHT

## 2020-06-08 ENCOUNTER — CLINICAL SUPPORT (OUTPATIENT)
Dept: REHABILITATION | Facility: HOSPITAL | Age: 80
End: 2020-06-08
Attending: ORTHOPAEDIC SURGERY
Payer: MEDICARE

## 2020-06-08 DIAGNOSIS — M25.542 PAIN IN JOINT OF LEFT HAND: ICD-10-CM

## 2020-06-08 DIAGNOSIS — M25.512 LEFT SHOULDER PAIN, UNSPECIFIED CHRONICITY: ICD-10-CM

## 2020-06-08 DIAGNOSIS — M25.442 EFFUSION OF JOINT OF LEFT HAND: ICD-10-CM

## 2020-06-08 DIAGNOSIS — R29.898 LEFT HAND WEAKNESS: ICD-10-CM

## 2020-06-08 DIAGNOSIS — M25.642 STIFFNESS OF LEFT HAND JOINT: ICD-10-CM

## 2020-06-08 DIAGNOSIS — R29.898 LEFT ARM WEAKNESS: ICD-10-CM

## 2020-06-08 DIAGNOSIS — M25.612 SHOULDER STIFFNESS, LEFT: Primary | ICD-10-CM

## 2020-06-08 PROCEDURE — 97110 THERAPEUTIC EXERCISES: CPT | Mod: PN

## 2020-06-08 NOTE — PROGRESS NOTES
Occupational Therapy Daily Treatment Note     Date: 6/8/2020  Name: Joan Josue  Clinic Number: [id number]    Therapy Diagnosis:   Encounter Diagnoses   Name Primary?    Shoulder stiffness, left Yes    Left arm weakness     Stiffness of left hand joint     Pain in joint of left hand     Effusion of joint of left hand     Left hand weakness     Left shoulder pain, unspecified chronicity      Physician: Renzo Plata II, MD    Physician Orders: evaluate and tx  Medical Diagnosis: left distal radius fx, proximal humeral fx  Surgical Procedure and Date: left distal radius fx ORIF, 4-9-20  Evaluation Date: 6/1/2020  Insurance Authorization Period Expiration:referral# [id number] 5-29-20 thru 7-31-20  Plan of Care Certification Period: 6-1-20 thru 7-13-20  Date of Return to MD: see epic    Visit # / Visits authorized: 2 / 20  Time In:10  Time Out: 1045  Total Billable Time: 45 minutes    Precautions:  Universal, see epic    Subjective     Pt reports: no new issues  she is compliant with home exercise program.  Response to previous treatment:fair  Functional change: slight increase in use with self care    Pain: 0/10 rest; 4/10 light use  Location: diffuse thru left arm      ache  Objective       Timed units:  3 therapeutic exercise                            Time:        Measurements: n/a        Fluido: n/a  U/s:n/a      UBE: n/a    Scar massage: n/a     Stretches as tolerated-pain free: er 0 abd, sup, df/pd      Manual: n/a    ROM: n/a    PRE: n/a      HEP: see below    Home Exercises and Education Provided     Education provided:     progress towards goals   likely tx progression  rationale of rehab interventions    Written Home Exercises Provided: yes. df/pf stretches  Previously issued exercises were reviewed if still part of current tx plan as well as any issued today and Joan was able to demonstrate them prior to the end of the session.  Joan demonstrated good  understanding of the HEP  provided.     See EMR under patient instructions and/or media for HEP issued today or in past      Required significant review of HEP with daughter present for correct performance (looks to have memory and attention deficits)  Assessment   May need more frequent reinforcement at home to encourage proper stretching technique with HEP      Pt would continue to benefit from skilled OT in order to facilitate full functional use left arm and proper mechanics    Joan is progressing well towards her goals and there are no updates to goals at this time. Pt prognosis is good  Pt will continue to benefit from skilled outpatient occupational therapy to address the deficits listed in the problem list on initial evaluation to provide pt/family education and to maximize pt's level of independence in the home and community environment.     Anticipated barriers to occupational therapy: edema, pain, stiffness, weakness, what appears to be attention and memory deficits    Pt's spiritual, cultural and educational needs considered and pt agreeable to plan of care and goals.    Goals:  stg to be met in 3 weeks 1. Patient will be I with HEP 2. Patient will have 2/10 pain with light use 3. Patient will have = prom of bilateral arms to enhance affected arm use with ADL        ltg to be met by d/c 1. Patient will be I with d/c HEP 2. Patient will have 1/10 pain with all use 3. Patient will have about 75% /pinch  on affected side vs unaffected side to promote full functional use  goal                                                                     4. Patient will be I with all ADL  5. Patient will have about 3+/5 MMT elevation left arm to improve use with ADL     all goals ongoing unless otherwise noted              Any goals met today ?  (if any met see above)    Updates/Grading for next session: prn    Plan: edema control, scar management, ROM, PRE, patient education, manual tx, prn tx      Donovan Bryant, OT /CHT

## 2020-06-12 ENCOUNTER — CLINICAL SUPPORT (OUTPATIENT)
Dept: REHABILITATION | Facility: HOSPITAL | Age: 80
End: 2020-06-12
Attending: ORTHOPAEDIC SURGERY
Payer: MEDICARE

## 2020-06-12 DIAGNOSIS — M25.542 PAIN IN JOINT OF LEFT HAND: ICD-10-CM

## 2020-06-12 DIAGNOSIS — M25.442 EFFUSION OF JOINT OF LEFT HAND: ICD-10-CM

## 2020-06-12 DIAGNOSIS — M25.642 STIFFNESS OF LEFT HAND JOINT: ICD-10-CM

## 2020-06-12 DIAGNOSIS — M25.612 SHOULDER STIFFNESS, LEFT: Primary | ICD-10-CM

## 2020-06-12 DIAGNOSIS — R29.898 LEFT ARM WEAKNESS: ICD-10-CM

## 2020-06-12 DIAGNOSIS — M25.512 LEFT SHOULDER PAIN, UNSPECIFIED CHRONICITY: ICD-10-CM

## 2020-06-12 DIAGNOSIS — R29.898 LEFT HAND WEAKNESS: ICD-10-CM

## 2020-06-12 PROCEDURE — 97110 THERAPEUTIC EXERCISES: CPT | Mod: PN

## 2020-06-12 NOTE — PROGRESS NOTES
Occupational Therapy Daily Treatment Note     Date: 6/12/2020  Name: Joan Josue  Clinic Number: [id number]    Therapy Diagnosis:   Encounter Diagnoses   Name Primary?    Shoulder stiffness, left Yes    Left arm weakness     Stiffness of left hand joint     Pain in joint of left hand     Effusion of joint of left hand     Left hand weakness     Left shoulder pain, unspecified chronicity      Physician: Renzo Plata II, MD    Physician Orders: evaluate and tx  Medical Diagnosis: left distal radius fx, proximal humeral fx  Surgical Procedure and Date: left distal radius fx ORIF, 4-9-20  Evaluation Date: 6/1/2020  Insurance Authorization Period Expiration:referral# [id number] 5-29-20 thru 7-31-20  Plan of Care Certification Period: 6-1-20 thru 7-13-20  Date of Return to MD: see epic    Visit # / Visits authorized: 3 / 20  Time In; 945  Time Out: 1030  Total Billable Time: 45 minutes    Precautions:  Universal, see epic    Subjective     Pt reports: no new issues  she is compliant with home exercise program.  Response to previous treatment:fair  Functional change: slight increase in use with self care continues    Pain: 0/10 rest; 4/10 light use  Location: diffuse thru left arm      ache  Objective       Timed units:  3 therapeutic exercise                            Time:945-1030        Measurements: n/a        Fluido: n/a  U/s:n/a      UBE: n/a    Scar massage: n/a     Stretches as tolerated-pain free: er 0,45 abd; sup, df/pf      Manual: n/a    ROM: n/a    PRE: n/a      HEP: see below    Home Exercises and Education Provided     Education provided:     progress towards goals   likely tx progression  rationale of rehab interventions    Written Home Exercises Provided: yes. er 45 abd stretches  Previously issued exercises were reviewed if still part of current tx plan as well as any issued today and Joan was able to demonstrate them prior to the end of the session.  Joan demonstrated good   understanding of the HEP provided.     See EMR under patient instructions and/or media for HEP issued today or in past      Required significant review of HEP with daughter present for correct performance (looks to have memory and attention deficits)  Assessment     Full prom for all 5 digits      Pt would continue to benefit from skilled OT in order to facilitate full functional use left arm and proper mechanics    Joan is progressing well towards her goals and there are no updates to goals at this time. Pt prognosis is good  Pt will continue to benefit from skilled outpatient occupational therapy to address the deficits listed in the problem list on initial evaluation to provide pt/family education and to maximize pt's level of independence in the home and community environment.     Anticipated barriers to occupational therapy: edema, pain, stiffness, weakness, what appears to be attention and memory deficits    Pt's spiritual, cultural and educational needs considered and pt agreeable to plan of care and goals.    Goals:  stg to be met in 3 weeks 1. Patient will be I with HEP 2. Patient will have 2/10 pain with light use 3. Patient will have = prom of bilateral arms to enhance affected arm use with ADL        ltg to be met by d/c 1. Patient will be I with d/c HEP 2. Patient will have 1/10 pain with all use 3. Patient will have about 75% /pinch  on affected side vs unaffected side to promote full functional use  goal                                                                     4. Patient will be I with all ADL  5. Patient will have about 3+/5 MMT elevation left arm to improve use with ADL     all goals ongoing unless otherwise noted              Any goals met today ?  (if any met see above)    Updates/Grading for next session: prn    Plan: edema control, scar management, ROM, PRE, patient education, manual tx, prn tx      Donovan Bryant, OT /CHT

## 2020-06-15 ENCOUNTER — CLINICAL SUPPORT (OUTPATIENT)
Dept: REHABILITATION | Facility: HOSPITAL | Age: 80
End: 2020-06-15
Attending: ORTHOPAEDIC SURGERY
Payer: MEDICARE

## 2020-06-15 DIAGNOSIS — M25.642 STIFFNESS OF LEFT HAND JOINT: ICD-10-CM

## 2020-06-15 DIAGNOSIS — M25.612 SHOULDER STIFFNESS, LEFT: Primary | ICD-10-CM

## 2020-06-15 DIAGNOSIS — R29.898 LEFT ARM WEAKNESS: ICD-10-CM

## 2020-06-15 DIAGNOSIS — R29.898 LEFT HAND WEAKNESS: ICD-10-CM

## 2020-06-15 DIAGNOSIS — M25.442 EFFUSION OF JOINT OF LEFT HAND: ICD-10-CM

## 2020-06-15 DIAGNOSIS — M25.542 PAIN IN JOINT OF LEFT HAND: ICD-10-CM

## 2020-06-15 DIAGNOSIS — M25.512 LEFT SHOULDER PAIN, UNSPECIFIED CHRONICITY: ICD-10-CM

## 2020-06-15 PROCEDURE — 97110 THERAPEUTIC EXERCISES: CPT | Mod: PN

## 2020-06-15 NOTE — PROGRESS NOTES
Occupational Therapy Daily Treatment Note     Date: 6/15/2020  Name: Joan Josue  Clinic Number: [id number]    Therapy Diagnosis:   Encounter Diagnoses   Name Primary?    Shoulder stiffness, left Yes    Left arm weakness     Stiffness of left hand joint     Pain in joint of left hand     Effusion of joint of left hand     Left hand weakness     Left shoulder pain, unspecified chronicity      Physician: Renzo Plata II, MD    Physician Orders: evaluate and tx  Medical Diagnosis: left distal radius fx, proximal humeral fx  Surgical Procedure and Date: left distal radius fx ORIF, 4-9-20  Evaluation Date: 6/1/2020  Insurance Authorization Period Expiration:referral# [id number] 5-29-20 thru 7-31-20  Plan of Care Certification Period: 6-1-20 thru 7-13-20  Date of Return to MD: see epic    Visit # / Visits authorized: 4 / 20  Time In; 915  Time Out: 10  Total Billable Time: 45 minutes    Precautions:  Universal, see epic    Subjective     Pt reports: no new issues  she is compliant with home exercise program.  Response to previous treatment:fair  Functional change: slight increase in use with self care continues especially dressing    Pain: 0/10 rest; 4/10 light use  Location: diffuse thru left arm      ache  Objective       Timed units:  3 therapeutic exercise                            Time:915-10        Measurements:    circumferential mcp 2 thru 5    Right 19.5 cm left 20.2 cm        Fluido: n/a  U/s:n/a      UBE: n/a    Scar massage: n/a     Stretches as tolerated-pain free: er 0,45 abd; sup, df/pf      Manual: n/a    ROM: n/a    PRE: n/a      HEP: see below    Home Exercises and Education Provided     Education provided:     How to order a large open finger therapeutic isotoner glove for edema control      progress towards goals   likely tx progression  rationale of rehab interventions    Written Home Exercises Provided: no  Previously issued exercises were reviewed if still part of current tx plan as  well as any issued today and Joan was able to demonstrate them prior to the end of the session.  Joan demonstrated good  understanding of the HEP provided.     See EMR under patient instructions and/or media for HEP issued today or in past        Assessment     Slow but steady decrease in pain and tightness continues          Pt would continue to benefit from skilled OT in order to facilitate full functional use left arm and proper mechanics    Joan is progressing well towards her goals and there are no updates to goals at this time. Pt prognosis is good  Pt will continue to benefit from skilled outpatient occupational therapy to address the deficits listed in the problem list on initial evaluation to provide pt/family education and to maximize pt's level of independence in the home and community environment.     Anticipated barriers to occupational therapy: edema, pain, stiffness, weakness, what appears to be attention and memory deficits    Pt's spiritual, cultural and educational needs considered and pt agreeable to plan of care and goals.    Goals:  stg to be met in 3 weeks 1. Patient will be I with HEP 2. Patient will have 2/10 pain with light use 3. Patient will have = prom of bilateral arms to enhance affected arm use with ADL        ltg to be met by d/c 1. Patient will be I with d/c HEP 2. Patient will have 1/10 pain with all use 3. Patient will have about 75% /pinch  on affected side vs unaffected side to promote full functional use  goal                                                                     4. Patient will be I with all ADL  5. Patient will have about 3+/5 MMT elevation left arm to improve use with ADL     all goals ongoing unless otherwise noted              Any goals met today ?  (if any met see above)    Updates/Grading for next session: prn, consider prom er x 3 and ir x 2 test    Plan: edema control, scar management, ROM, PRE, patient education, manual tx, prn  tx      Donovan Bryant, OT /CHT

## 2020-06-19 ENCOUNTER — CLINICAL SUPPORT (OUTPATIENT)
Dept: REHABILITATION | Facility: HOSPITAL | Age: 80
End: 2020-06-19
Attending: ORTHOPAEDIC SURGERY
Payer: MEDICARE

## 2020-06-19 DIAGNOSIS — M25.542 PAIN IN JOINT OF LEFT HAND: ICD-10-CM

## 2020-06-19 DIAGNOSIS — M25.642 STIFFNESS OF LEFT HAND JOINT: ICD-10-CM

## 2020-06-19 DIAGNOSIS — M25.612 SHOULDER STIFFNESS, LEFT: Primary | ICD-10-CM

## 2020-06-19 DIAGNOSIS — M25.442 EFFUSION OF JOINT OF LEFT HAND: ICD-10-CM

## 2020-06-19 DIAGNOSIS — R29.898 LEFT ARM WEAKNESS: ICD-10-CM

## 2020-06-19 DIAGNOSIS — M25.512 LEFT SHOULDER PAIN, UNSPECIFIED CHRONICITY: ICD-10-CM

## 2020-06-19 DIAGNOSIS — R29.898 LEFT HAND WEAKNESS: ICD-10-CM

## 2020-06-19 PROCEDURE — 97110 THERAPEUTIC EXERCISES: CPT | Mod: PN

## 2020-06-19 NOTE — PROGRESS NOTES
"  Occupational Therapy Daily Treatment Note     Date: 6/19/2020  Name: Joan Josue  Clinic Number: [id number]    Therapy Diagnosis:   Encounter Diagnoses   Name Primary?    Shoulder stiffness, left Yes    Left arm weakness     Stiffness of left hand joint     Pain in joint of left hand     Effusion of joint of left hand     Left hand weakness     Left shoulder pain, unspecified chronicity      Physician: Renzo Plata II, MD    Physician Orders: evaluate and tx  Medical Diagnosis: left distal radius fx, proximal humeral fx  Surgical Procedure and Date: left distal radius fx ORIF, 4-9-20  Evaluation Date: 6/1/2020  Insurance Authorization Period Expiration:referral# [id number] 5-29-20 thru 7-31-20  Plan of Care Certification Period: 6-1-20 thru 7-13-20  Date of Return to MD: see epic    Visit # / Visits authorized: 5 / 20 referral # [id number] 5-29-20 thru 7-31-20  Time In; 945  Time Out: 1030  Total Billable Time: 45 minutes    Precautions:  Universal, see epic    Subjective     Pt reports: no new issues  she is compliant with home exercise program.  Response to previous treatment:fair  Functional change: slight increase in use with self  and home care continues  Pain: 0/10 rest; 3/10 light use  Location: diffuse thru left arm      ache  Objective       Timed units:  3 therapeutic exercise                            Time:945-1030        Measurements:    Full prom all digits yet had mild tension at end ranges all planes except composite ext digits 1 thru 5    r prom er at 0 abd 80   at 45 abd 90    at 90 abd 90;       sidelying ir  60        ir behind back 14"    lift off  l prom er at 0 abd 60    at 45 abd 72    at  90 abd 60;      sidelying ir 40         ir behind back 14"   lift off      Fluido: n/a  U/s:n/a      UBE: n/a    Scar massage: n/a     Stretches as tolerated-pain free: er 45, 90 abd; sleeper;  df/pf      Manual: n/a    ROM: n/a    PRE: n/a      HEP: see below    Home Exercises and Education " Provided     Education provided:           progress towards goals   likely tx progression  rationale of rehab interventions    Written Home Exercises Provided: sidelying stretch, see media  Previously issued exercises were reviewed if still part of current tx plan as well as any issued today and Joan was able to demonstrate them prior to the end of the session.  Joan demonstrated good  understanding of the HEP provided.     See EMR under patient instructions and/or media for HEP issued today or in past        Assessment     Looks faithful with HEP, light functional use and movement patterns continue to improve        Pt would continue to benefit from skilled OT in order to facilitate full functional use left arm and proper mechanics    Joan is progressing well towards her goals and there are no updates to goals at this time. Pt prognosis is good  Pt will continue to benefit from skilled outpatient occupational therapy to address the deficits listed in the problem list on initial evaluation to provide pt/family education and to maximize pt's level of independence in the home and community environment.     Anticipated barriers to occupational therapy: edema, pain, stiffness, weakness, what appears to be attention and memory deficits    Pt's spiritual, cultural and educational needs considered and pt agreeable to plan of care and goals.    Goals:  stg to be met in 3 weeks 1. Patient will be I with HEP 2. Patient will have 2/10 pain with light use 3. Patient will have = prom of bilateral arms to enhance affected arm use with ADL        ltg to be met by d/c 1. Patient will be I with d/c HEP 2. Patient will have 1/10 pain with all use 3. Patient will have about 75% /pinch  on affected side vs unaffected side to promote full functional use  goal                                                                     4. Patient will be I with all ADL  5. Patient will have about 3+/5 MMT elevation left arm to  improve use with ADL     all goals ongoing unless otherwise noted              Any goals met today ?  (if any met see above)    Updates/Grading for next session: prn, consider prom measurement forearm and wrist    Plan: edema control, scar management, ROM, PRE, patient education, manual tx, prn tx      Donovan Bryant, OT /CHT

## 2020-06-22 ENCOUNTER — CLINICAL SUPPORT (OUTPATIENT)
Dept: REHABILITATION | Facility: HOSPITAL | Age: 80
End: 2020-06-22
Attending: ORTHOPAEDIC SURGERY
Payer: MEDICARE

## 2020-06-22 DIAGNOSIS — M25.642 STIFFNESS OF LEFT HAND JOINT: ICD-10-CM

## 2020-06-22 DIAGNOSIS — M25.612 SHOULDER STIFFNESS, LEFT: Primary | ICD-10-CM

## 2020-06-22 DIAGNOSIS — M25.542 PAIN IN JOINT OF LEFT HAND: ICD-10-CM

## 2020-06-22 DIAGNOSIS — M25.442 EFFUSION OF JOINT OF LEFT HAND: ICD-10-CM

## 2020-06-22 DIAGNOSIS — R29.898 LEFT ARM WEAKNESS: ICD-10-CM

## 2020-06-22 DIAGNOSIS — M25.512 LEFT SHOULDER PAIN, UNSPECIFIED CHRONICITY: ICD-10-CM

## 2020-06-22 DIAGNOSIS — R29.898 LEFT HAND WEAKNESS: ICD-10-CM

## 2020-06-22 PROCEDURE — 97110 THERAPEUTIC EXERCISES: CPT | Mod: PN

## 2020-06-22 NOTE — PROGRESS NOTES
Occupational Therapy Daily Treatment Note     Date: 6/22/2020  Name: Joan Josue  Clinic Number: [id number]    Therapy Diagnosis:   Encounter Diagnoses   Name Primary?    Shoulder stiffness, left Yes    Left arm weakness     Stiffness of left hand joint     Pain in joint of left hand     Effusion of joint of left hand     Left hand weakness     Left shoulder pain, unspecified chronicity      Physician: Renzo Plata II, MD    Physician Orders: evaluate and tx  Medical Diagnosis: left distal radius fx, proximal humeral fx  Surgical Procedure and Date: left distal radius fx ORIF, 4-9-20  Evaluation Date: 6/1/2020  Insurance Authorization Period Expiration:referral# [id number] 5-29-20 thru 7-31-20  Plan of Care Certification Period: 6-1-20 thru 7-13-20  Date of Return to MD: see epic    Visit # / Visits authorized: 6 / 20 referral # [id number] 5-29-20 thru 7-31-20  Time In: 10  Time Out: 1045  Total Billable Time: 45 minutes    Precautions:  Universal, see epic    Subjective     Pt reports: no new issues, doing HEP however does say some days getting done less often than instructed to do  she is compliant with home exercise program.  Response to previous treatment:fair  Functional change: slight increase in use with self  and home care continues especially grooming and hygiene  Pain: 0/10 rest; 3/10 light use  Location: diffuse thru left arm      ache  Objective       Timed units:  3 therapeutic exercise                            Time:        Measurements:                                  Right          Left   Elbow ext/flex         0/150         10/150  Sup/pro                 90/90          70/90  Df/pf                     80/90           38/40  Rd/ud                   20/40           20/36    Fluido: n/a  U/s:n/a      UBE: n/a    Scar massage: n/a     Stretches as tolerated-pain free: er 0, 45, 90 abd; sleeper;  df/pf      Manual: n/a    ROM: n/a    PRE: n/a      HEP: see below    Home Exercises  and Education Provided     Education provided:     Need to do HEP as often as instructed      progress towards goals   likely tx progression  rationale of rehab interventions    Written Home Exercises Provided: no  Previously issued exercises were reviewed if still part of current tx plan as well as any issued today and Joan was able to demonstrate them prior to the end of the session.  Joan demonstrated good  understanding of the HEP provided.     See EMR under patient instructions and/or media for HEP issued today or in past        Assessment     Good reduction in stiffness since evaluation        Pt would continue to benefit from skilled OT in order to facilitate full functional use left arm and proper mechanics    Joan is progressing well towards her goals and there are no updates to goals at this time. Pt prognosis is good  Pt will continue to benefit from skilled outpatient occupational therapy to address the deficits listed in the problem list on initial evaluation to provide pt/family education and to maximize pt's level of independence in the home and community environment.     Anticipated barriers to occupational therapy: edema, pain, stiffness, weakness, what appears to be attention and memory deficits    Pt's spiritual, cultural and educational needs considered and pt agreeable to plan of care and goals.    Goals:  stg to be met in 3 weeks 1. Patient will be I with HEP 2. Patient will have 2/10 pain with light use 3. Patient will have = prom of bilateral arms to enhance affected arm use with ADL        ltg to be met by d/c 1. Patient will be I with d/c HEP 2. Patient will have 1/10 pain with all use 3. Patient will have about 75% /pinch  on affected side vs unaffected side to promote full functional use  goal                                                                     4. Patient will be I with all ADL  5. Patient will have about 3+/5 MMT elevation left arm to improve use with  ADL     all goals ongoing unless otherwise noted              Any goals met today ?  (if any met see above)    Updates/Grading for next session: prn, consider prom shoulder    Plan: edema control, scar management, ROM, PRE, patient education, manual tx, prn tx      Donovan Bryant, OT /CHT

## 2020-06-23 ENCOUNTER — TELEPHONE (OUTPATIENT)
Dept: ADMINISTRATIVE | Facility: OTHER | Age: 80
End: 2020-06-23

## 2020-06-26 ENCOUNTER — CLINICAL SUPPORT (OUTPATIENT)
Dept: REHABILITATION | Facility: HOSPITAL | Age: 80
End: 2020-06-26
Attending: ORTHOPAEDIC SURGERY
Payer: MEDICARE

## 2020-06-26 DIAGNOSIS — S42.292A CLOSED 4-PART FRACTURE OF PROXIMAL HUMERUS, LEFT, INITIAL ENCOUNTER: Primary | ICD-10-CM

## 2020-06-26 DIAGNOSIS — R29.898 LEFT ARM WEAKNESS: ICD-10-CM

## 2020-06-26 DIAGNOSIS — M25.542 PAIN IN JOINT OF LEFT HAND: ICD-10-CM

## 2020-06-26 DIAGNOSIS — R29.898 LEFT HAND WEAKNESS: ICD-10-CM

## 2020-06-26 DIAGNOSIS — S52.572D OTHER CLOSED INTRA-ARTICULAR FRACTURE OF DISTAL END OF LEFT RADIUS WITH ROUTINE HEALING, SUBSEQUENT ENCOUNTER: ICD-10-CM

## 2020-06-26 DIAGNOSIS — M25.512 LEFT SHOULDER PAIN, UNSPECIFIED CHRONICITY: ICD-10-CM

## 2020-06-26 DIAGNOSIS — M25.612 SHOULDER STIFFNESS, LEFT: Primary | ICD-10-CM

## 2020-06-26 DIAGNOSIS — M25.442 EFFUSION OF JOINT OF LEFT HAND: ICD-10-CM

## 2020-06-26 DIAGNOSIS — M25.642 STIFFNESS OF LEFT HAND JOINT: ICD-10-CM

## 2020-06-26 PROCEDURE — 97110 THERAPEUTIC EXERCISES: CPT | Mod: PN

## 2020-06-26 NOTE — PROGRESS NOTES
"  Occupational Therapy Daily Treatment Note     Date: 6/26/2020  Name: Joan Josue  Clinic Number: [id number]    Therapy Diagnosis:   Encounter Diagnoses   Name Primary?    Shoulder stiffness, left Yes    Left arm weakness     Stiffness of left hand joint     Pain in joint of left hand     Effusion of joint of left hand     Left hand weakness     Left shoulder pain, unspecified chronicity      Physician: Renzo Plata II, MD    Physician Orders: evaluate and tx  Medical Diagnosis: left distal radius fx, proximal humeral fx  Surgical Procedure and Date: left distal radius fx ORIF, 4-9-20  Evaluation Date: 6/1/2020  Insurance Authorization Period Expiration:referral# [id number] 5-29-20 thru 7-31-20  Plan of Care Certification Period: 6-1-20 thru 7-13-20  Date of Return to MD: see epic    Visit # / Visits authorized: 7 / 20 referral # [id number] 5-29-20 thru 7-31-20  Time In: 945  Time Out: 1030  Total Billable Time: 45 minutes    Precautions:  Universal, see epic    Subjective     Pt reports: no new issues, she is compliant with home exercise program. Using left arm for some painting with art projects.  Response to previous treatment: good  Functional change: slow but steady increase in use with all light required tasks  Pain: 0/10 rest; 3/10 light use  Location: diffuse thru left arm      ache  Objective       Timed units:  3 therapeutic exercise                            Time:945-1030      Measurements:                                    r prom er at 0 abd 80   at 45 abd 90    at 90 abd 90;       sidelying ir  60        ir behind back 14"    lift off  l prom er at 0 abd 70    at 45 abd 80    at  90 abd 72;      sidelying ir 52         ir behind back 14"   lift off          Fluido: n/a  U/s:n/a      UBE: n/a    Scar massage: n/a     Stretches as tolerated-pain free: er 0, 45, 90 abd; sleeper;  df/pf      Manual: n/a    ROM: n/a    PRE: n/a      HEP: see below    Home Exercises and Education Provided "     Education provided:     Explained overall tightness continues to decrease      progress towards goals   likely tx progression  rationale of rehab interventions    Written Home Exercises Provided: no  Previously issued exercises were reviewed if still part of current tx plan as well as any issued today and Joan was able to demonstrate them prior to the end of the session.  Joan demonstrated good  understanding of the HEP provided.     See EMR under patient instructions and/or media for HEP issued today or in past        Assessment     All joints with normal end feels, may benefit from rx traction and glides for pain relief and to increase prom        Pt would continue to benefit from skilled OT in order to facilitate full functional use left arm and proper mechanics    Joan is progressing well towards her goals and there are no updates to goals at this time. Pt prognosis is good  Pt will continue to benefit from skilled outpatient occupational therapy to address the deficits listed in the problem list on initial evaluation to provide pt/family education and to maximize pt's level of independence in the home and community environment.     Anticipated barriers to occupational therapy: edema, pain, stiffness, weakness, what appears to be attention and memory deficits    Pt's spiritual, cultural and educational needs considered and pt agreeable to plan of care and goals.    Goals:  stg to be met in 3 weeks 1. Patient will be I with HEP 2. Patient will have 2/10 pain with light use 3. Patient will have = prom of bilateral arms to enhance affected arm use with ADL        ltg to be met by d/c 1. Patient will be I with d/c HEP 2. Patient will have 1/10 pain with all use 3. Patient will have about 75% /pinch  on affected side vs unaffected side to promote full functional use  goal                                                                     4. Patient will be I with all ADL  5. Patient will have  about 3+/5 MMT elevation left arm to improve use with ADL     all goals ongoing unless otherwise noted              Any goals met today ?  (if any met see above)    Updates/Grading for next session: prn, consider prom elbow and wrist    Plan: edema control, scar management, ROM, PRE, patient education, manual tx, prn tx      Donovan Bryant, OT /CHT

## 2020-06-27 NOTE — PROGRESS NOTES
CC:  79-year-old female follows up status post ORIF of left distal radius.  The patient also has a left proximal humerus fracture that has been treated non operatively.  Overall she is doing well.  She states she occasionally has a pain that she rates as a 1/10.    Left Upper Extremity Examination     Skin is intact throughout   Motor is intact distally radial, median, ulnar, AIN, PIN   +2 radial and ulnar pulses   Sensation to light touch is intact distally radial, median, and ulnar   Full ROM at the DIP, PIP, and MCP joints   Wrist shows decreased ROM with regards supination.  The patient lacks about the last 10° of full supination.  No tenderness to palpation noted     Carpal Tunnel compression test - negative   Phalen's Test - negative  Tinel's Test - negative  Finkelstien's Test - negative    No Ecchymosis noted   No Swelling noted     Triggering of fingers or thumb - negative    X-ray images were examined and personally interpreted by me.  Three views of the left wrist dated 06/29/2020 show a healed distal radius fracture.  There is plate and screw fixation as well fixed with no evidence of loosening.  Osteopenia is noted.    Dx:  Left distal radius fracture, status post ORIF now healed    Plan:  Activity as tolerated.  Follow-up p.r.n..

## 2020-06-29 ENCOUNTER — HOSPITAL ENCOUNTER (OUTPATIENT)
Dept: RADIOLOGY | Facility: HOSPITAL | Age: 80
Discharge: HOME | End: 2020-06-29
Attending: ORTHOPAEDIC SURGERY
Payer: MEDICARE

## 2020-06-29 ENCOUNTER — OFFICE VISIT (OUTPATIENT)
Dept: ORTHOPEDICS | Facility: CLINIC | Age: 80
End: 2020-06-29
Payer: MEDICARE

## 2020-06-29 VITALS — WEIGHT: 124 LBS | HEIGHT: 69 IN | TEMPERATURE: 99 F | BODY MASS INDEX: 18.37 KG/M2

## 2020-06-29 DIAGNOSIS — S52.572D OTHER CLOSED INTRA-ARTICULAR FRACTURE OF DISTAL END OF LEFT RADIUS WITH ROUTINE HEALING, SUBSEQUENT ENCOUNTER: ICD-10-CM

## 2020-06-29 DIAGNOSIS — S52.572D OTHER CLOSED INTRA-ARTICULAR FRACTURE OF DISTAL END OF LEFT RADIUS WITH ROUTINE HEALING, SUBSEQUENT ENCOUNTER: Primary | ICD-10-CM

## 2020-06-29 PROCEDURE — 73110 XR WRIST COMPLETE 3 VIEWS LEFT: ICD-10-PCS | Mod: 26,LT,, | Performed by: RADIOLOGY

## 2020-06-29 PROCEDURE — 99024 POSTOP FOLLOW-UP VISIT: CPT | Mod: S$GLB,,, | Performed by: ORTHOPAEDIC SURGERY

## 2020-06-29 PROCEDURE — 73110 X-RAY EXAM OF WRIST: CPT | Mod: 26,LT,, | Performed by: RADIOLOGY

## 2020-06-29 PROCEDURE — 73110 X-RAY EXAM OF WRIST: CPT | Mod: TC,PN,LT

## 2020-06-29 PROCEDURE — 99999 PR PBB SHADOW E&M-EST. PATIENT-LVL III: ICD-10-PCS | Mod: PBBFAC,,, | Performed by: ORTHOPAEDIC SURGERY

## 2020-06-29 PROCEDURE — 99024 PR POST-OP FOLLOW-UP VISIT: ICD-10-PCS | Mod: S$GLB,,, | Performed by: ORTHOPAEDIC SURGERY

## 2020-06-29 PROCEDURE — 99999 PR PBB SHADOW E&M-EST. PATIENT-LVL III: CPT | Mod: PBBFAC,,, | Performed by: ORTHOPAEDIC SURGERY

## 2020-07-02 ENCOUNTER — CLINICAL SUPPORT (OUTPATIENT)
Dept: REHABILITATION | Facility: HOSPITAL | Age: 80
End: 2020-07-02
Attending: ORTHOPAEDIC SURGERY
Payer: MEDICARE

## 2020-07-02 DIAGNOSIS — M25.612 SHOULDER STIFFNESS, LEFT: Primary | ICD-10-CM

## 2020-07-02 DIAGNOSIS — R29.898 LEFT HAND WEAKNESS: ICD-10-CM

## 2020-07-02 DIAGNOSIS — M25.542 PAIN IN JOINT OF LEFT HAND: ICD-10-CM

## 2020-07-02 DIAGNOSIS — M25.512 LEFT SHOULDER PAIN, UNSPECIFIED CHRONICITY: ICD-10-CM

## 2020-07-02 DIAGNOSIS — R29.898 LEFT ARM WEAKNESS: ICD-10-CM

## 2020-07-02 DIAGNOSIS — M25.442 EFFUSION OF JOINT OF LEFT HAND: ICD-10-CM

## 2020-07-02 DIAGNOSIS — M25.642 STIFFNESS OF LEFT HAND JOINT: ICD-10-CM

## 2020-07-02 PROCEDURE — 97110 THERAPEUTIC EXERCISES: CPT | Mod: PN

## 2020-07-02 NOTE — PROGRESS NOTES
Occupational Therapy Daily Treatment Note     Date: 7/2/2020  Name: Joan Josue  Clinic Number: [id number]    Therapy Diagnosis:   Encounter Diagnoses   Name Primary?    Shoulder stiffness, left Yes    Left arm weakness     Stiffness of left hand joint     Pain in joint of left hand     Effusion of joint of left hand     Left hand weakness     Left shoulder pain, unspecified chronicity      Physician: Renzo Plata II, MD    Physician Orders: evaluate and tx  Medical Diagnosis: left distal radius fx, proximal humeral fx  Surgical Procedure and Date: left distal radius fx ORIF, 4-9-20  Evaluation Date: 6/1/2020  Insurance Authorization Period Expiration:referral# [id number] 5-29-20 thru 7-31-20  Plan of Care Certification Period: 6-1-20 thru 7-13-20  Date of Return to MD: see epic    Visit # / Visits authorized: 8 / 20 referral # [id number] 5-29-20 thru 7-31-20  Time In: 915  Time Out: 10  Total Billable Time: 45 minutes    Precautions:  Universal, see epic    Subjective     Pt reports: no new issues, she is compliant with home exercise program.   Response to previous treatment: good  Functional change: slow but steady increase in use with all light required tasks for self and home care  Pain: 0/10 rest; 3/10 light use  Location: diffuse thru left arm      ache  Objective       Timed units:  3 therapeutic exercise                            Time:915-10      Measurements:                                      Right         Left          Elbow ext/flex             0/150         0/150  Df/pf                           80/90        50/62  Rd/ud                        20/40         20/40          Fluido: n/a  U/s:n/a      UBE: n/a    Scar massage: n/a     Stretches as tolerated-pain free: er 0, 45, 90 abd; sleeper;  df/pf      Manual: n/a    ROM: n/a    PRE: n/a      HEP: see below      Saw ortho since last OT visit and activity prn cleared for hand      Home Exercises and Education Provided     Education  provided:     Basics of scapula humeral ratio      progress towards goals   likely tx progression  rationale of rehab interventions    Written Home Exercises Provided: no  Previously issued exercises were reviewed if still part of current tx plan as well as any issued today and Joan was able to demonstrate them prior to the end of the session.  Joan demonstrated good  understanding of the HEP provided.     See EMR under patient instructions and/or media for HEP issued today or in past        Assessment     Overall joint prom continues to improve, may benefit from  and pinch PRE as well as rc joint glides and traction        Pt would continue to benefit from skilled OT in order to facilitate full functional use left arm and proper mechanics    Joan is progressing well towards her goals and there are no updates to goals at this time. Pt prognosis is good  Pt will continue to benefit from skilled outpatient occupational therapy to address the deficits listed in the problem list on initial evaluation to provide pt/family education and to maximize pt's level of independence in the home and community environment.     Anticipated barriers to occupational therapy: edema, pain, stiffness, weakness, what appears to be attention and memory deficits    Pt's spiritual, cultural and educational needs considered and pt agreeable to plan of care and goals.    Goals:  stg to be met in 3 weeks 1. Patient will be I with HEP 2. Patient will have 2/10 pain with light use 3. Patient will have = prom of bilateral arms to enhance affected arm use with ADL        ltg to be met by d/c 1. Patient will be I with d/c HEP 2. Patient will have 1/10 pain with all use 3. Patient will have about 75% /pinch  on affected side vs unaffected side to promote full functional use  goal                                                                     4. Patient will be I with all ADL  5. Patient will have about 3+/5 MMT elevation left  arm to improve use with ADL     all goals ongoing unless otherwise noted              Any goals met today ?  (if any met see above)    Updates/Grading for next session: prn, consider  and pinch force testing    Plan: edema control, scar management, ROM, PRE, patient education, manual tx, prn tx      Donovan Bryant, OT /CHT

## 2020-07-09 ENCOUNTER — CLINICAL SUPPORT (OUTPATIENT)
Dept: REHABILITATION | Facility: HOSPITAL | Age: 80
End: 2020-07-09
Attending: ORTHOPAEDIC SURGERY
Payer: MEDICARE

## 2020-07-09 DIAGNOSIS — M25.542 PAIN IN JOINT OF LEFT HAND: ICD-10-CM

## 2020-07-09 DIAGNOSIS — M25.442 EFFUSION OF JOINT OF LEFT HAND: ICD-10-CM

## 2020-07-09 DIAGNOSIS — M25.642 STIFFNESS OF LEFT HAND JOINT: ICD-10-CM

## 2020-07-09 DIAGNOSIS — R29.898 LEFT ARM WEAKNESS: ICD-10-CM

## 2020-07-09 DIAGNOSIS — R29.898 LEFT HAND WEAKNESS: ICD-10-CM

## 2020-07-09 DIAGNOSIS — M25.612 SHOULDER STIFFNESS, LEFT: Primary | ICD-10-CM

## 2020-07-09 DIAGNOSIS — M25.512 LEFT SHOULDER PAIN, UNSPECIFIED CHRONICITY: ICD-10-CM

## 2020-07-09 PROCEDURE — 97110 THERAPEUTIC EXERCISES: CPT | Mod: PN

## 2020-07-09 NOTE — PROGRESS NOTES
Occupational Therapy Daily Treatment Note     Date: 7/9/2020  Name: Joan Josue  Clinic Number: [id number]    Therapy Diagnosis:   Encounter Diagnoses   Name Primary?    Shoulder stiffness, left Yes    Left arm weakness     Stiffness of left hand joint     Pain in joint of left hand     Effusion of joint of left hand     Left hand weakness     Left shoulder pain, unspecified chronicity      Physician: Renzo Plata II, MD    Physician Orders: evaluate and tx  Medical Diagnosis: left distal radius fx, proximal humeral fx  Surgical Procedure and Date: left distal radius fx ORIF, 4-9-20  Evaluation Date: 6/1/2020  Insurance Authorization Period Expiration:referral# [id number] 5-29-20 thru 7-31-20  Plan of Care Certification Period: 6-1-20 thru 7-13-20  Date of Return to MD: see epic    Visit # / Visits authorized: 9 / 20 referral # [id number] 5-29-20 thru 7-31-20  Time In: 830  Time Out: 915  Total Billable Time: 45 minutes    Precautions:  Universal, see epic    Subjective     Pt reports: no new issues, she is compliant with home exercise program.   Response to previous treatment: good  Functional change: feels like raising and reaching behind body continues to improve  Pain: 0/10 rest; 3/10 light use  Location: diffuse thru left arm      ache  Objective       Timed units:  3 therapeutic exercise                            Time:915-10      Measurements:                                                              II                III          key            3jaw                   tip  right             45#              40       11                9                     8  left                5                10           6                2                     2    Circumferential mcp 2 thru 5           Right 19.4 cm left 20.0  Fluido: n/a  U/s:n/a      UBE: n/a    Scar massage: n/a     Stretches as tolerated-pain free: er 0, 45, 90 abd; sleeper;  df/pf      Manual: n/a    ROM: n/a    PRE: n/a      HEP: see  below            Home Exercises and Education Provided     Education provided:     Need to fix stiffness for goo mechanics long term      progress towards goals   likely tx progression  rationale of rehab interventions    Written Home Exercises Provided: no  Previously issued exercises were reviewed if still part of current tx plan as well as any issued today and Joan was able to demonstrate them prior to the end of the session.  Joan demonstrated good  understanding of the HEP provided.     See EMR under patient instructions and/or media for HEP issued today or in past        Assessment     Significant decrease in strength left hand vs right        Pt would continue to benefit from skilled OT in order to facilitate full functional use left arm and proper mechanics    Joan is progressing well towards her goals and there are no updates to goals at this time. Pt prognosis is good  Pt will continue to benefit from skilled outpatient occupational therapy to address the deficits listed in the problem list on initial evaluation to provide pt/family education and to maximize pt's level of independence in the home and community environment.     Anticipated barriers to occupational therapy: edema, pain, stiffness, weakness, what appears to be attention and memory deficits    Pt's spiritual, cultural and educational needs considered and pt agreeable to plan of care and goals.    Goals:  stg to be met in 3 weeks 1. Patient will be I with HEP 2. Patient will have 2/10 pain with light use 3. Patient will have = prom of bilateral arms to enhance affected arm use with ADL        ltg to be met by d/c 1. Patient will be I with d/c HEP 2. Patient will have 1/10 pain with all use 3. Patient will have about 75% /pinch  on affected side vs unaffected side to promote full functional use  goal                                                                     4. Patient will be I with all ADL  5. Patient will have about  3+/5 MMT elevation left arm to improve use with ADL     all goals ongoing unless otherwise noted              Any goals met today ?  (if any met see above)    Updates/Grading for next session: prn, consider prom of shoulder testing    Plan: edema control, scar management, ROM, PRE, patient education, manual tx, prn tx      Donovan Bryant, OT /CHT

## 2020-07-17 ENCOUNTER — TELEPHONE (OUTPATIENT)
Dept: REHABILITATION | Facility: HOSPITAL | Age: 80
End: 2020-07-17

## 2020-07-17 ENCOUNTER — CLINICAL SUPPORT (OUTPATIENT)
Dept: REHABILITATION | Facility: HOSPITAL | Age: 80
End: 2020-07-17
Attending: ORTHOPAEDIC SURGERY
Payer: MEDICARE

## 2020-07-17 DIAGNOSIS — M25.612 SHOULDER STIFFNESS, LEFT: Primary | ICD-10-CM

## 2020-07-17 DIAGNOSIS — M25.542 PAIN IN JOINT OF LEFT HAND: ICD-10-CM

## 2020-07-17 DIAGNOSIS — R29.898 LEFT HAND WEAKNESS: ICD-10-CM

## 2020-07-17 DIAGNOSIS — M25.442 EFFUSION OF JOINT OF LEFT HAND: ICD-10-CM

## 2020-07-17 DIAGNOSIS — M25.512 LEFT SHOULDER PAIN, UNSPECIFIED CHRONICITY: ICD-10-CM

## 2020-07-17 DIAGNOSIS — M25.642 STIFFNESS OF LEFT HAND JOINT: ICD-10-CM

## 2020-07-17 DIAGNOSIS — R29.898 LEFT ARM WEAKNESS: ICD-10-CM

## 2020-07-17 PROCEDURE — 97110 THERAPEUTIC EXERCISES: CPT | Mod: PN

## 2020-07-17 NOTE — PROGRESS NOTES
Occupational Therapy Daily Treatment Note     Date: 7/17/2020  Name: Joan Josue  Clinic Number: [id number]    Therapy Diagnosis:   Encounter Diagnoses   Name Primary?    Shoulder stiffness, left Yes    Left arm weakness     Stiffness of left hand joint     Pain in joint of left hand     Effusion of joint of left hand     Left hand weakness     Left shoulder pain, unspecified chronicity      Physician: Renzo Plata II, MD    Physician Orders: evaluate and tx  Medical Diagnosis: left distal radius fx, proximal humeral fx  Surgical Procedure and Date: left distal radius fx ORIF, 4-9-20  Evaluation Date: 6/1/2020  Insurance Authorization Period Expiration:referral# [id number] 5-29-20 thru 7-31-20  Plan of Care Certification Period: 7-17-20 thru 8-14-20  Date of Return to MD: see epic    Visit # / Visits authorized: 10 / 20 referral # [id number] 5-29-20 thru 7-31-20  Time In: 920  Time Out: 945  Total Billable Time: 25 minutes    Precautions:  Universal, see epic    Subjective     Pt reports: no new issues, she is compliant with home exercise program.   Response to previous treatment: good  Functional change: light use continues to improve  Pain: 0/10 rest; 3/10 light use  Location: diffuse thru left arm      ache  Objective       Timed units:  1 therapeutic exercise                            Time:920-945      Measurements:                                      n/a      Fluido: n/a  U/s:n/a      UBE: n/a    Scar massage: n/a     Stretches as tolerated-pain free: er 0, 45, 90 abd; sleeper;  df/pf      Manual: n/a    ROM: n/a    PRE: n/a      HEP: see below            Home Exercises and Education Provided     Education provided:           progress towards goals   likely tx progression  rationale of rehab interventions    Written Home Exercises Provided: no  Previously issued exercises were reviewed if still part of current tx plan as well as any issued today and Joan was able to demonstrate them prior to  the end of the session.  Joan demonstrated good  understanding of the HEP provided.     See EMR under patient instructions and/or media for HEP issued today or in past        Assessment           Pt would continue to benefit from skilled OT in order to facilitate full functional use left arm and proper mechanics    Joan is progressing well towards her goals and there are no updates to goals at this time. Pt prognosis is good  Pt will continue to benefit from skilled outpatient occupational therapy to address the deficits listed in the problem list on initial evaluation to provide pt/family education and to maximize pt's level of independence in the home and community environment.     Anticipated barriers to occupational therapy: edema, pain, stiffness, weakness, what appears to be attention and memory deficits    Pt's spiritual, cultural and educational needs considered and pt agreeable to plan of care and goals.    Goals:  stg to be met in 3 weeks 1. Patient will be I with HEP 2. Patient will have 2/10 pain with light use 3. Patient will have = prom of bilateral arms to enhance affected arm use with ADL        ltg to be met by d/c 1. Patient will be I with d/c HEP 2. Patient will have 1/10 pain with all use 3. Patient will have about 75% /pinch  on affected side vs unaffected side to promote full functional use  goal                                                                     4. Patient will be I with all ADL  5. Patient will have about 3+/5 MMT elevation left arm to improve use with ADL     all goals ongoing unless otherwise noted              Any goals met today ?  (if any met see above)    Updates/Grading for next session: prn, consider prom of shoulder testing    Plan: edema control, scar management, ROM, PRE, patient education, manual tx, prn ranjith Bryant OT /CHT

## 2020-07-24 ENCOUNTER — CLINICAL SUPPORT (OUTPATIENT)
Dept: REHABILITATION | Facility: HOSPITAL | Age: 80
End: 2020-07-24
Attending: ORTHOPAEDIC SURGERY
Payer: MEDICARE

## 2020-07-24 DIAGNOSIS — M25.512 LEFT SHOULDER PAIN, UNSPECIFIED CHRONICITY: ICD-10-CM

## 2020-07-24 DIAGNOSIS — R29.898 LEFT ARM WEAKNESS: ICD-10-CM

## 2020-07-24 DIAGNOSIS — M25.542 PAIN IN JOINT OF LEFT HAND: ICD-10-CM

## 2020-07-24 DIAGNOSIS — M25.642 STIFFNESS OF LEFT HAND JOINT: ICD-10-CM

## 2020-07-24 DIAGNOSIS — M25.442 EFFUSION OF JOINT OF LEFT HAND: ICD-10-CM

## 2020-07-24 DIAGNOSIS — R29.898 LEFT HAND WEAKNESS: ICD-10-CM

## 2020-07-24 DIAGNOSIS — M25.612 SHOULDER STIFFNESS, LEFT: Primary | ICD-10-CM

## 2020-07-24 PROCEDURE — 97110 THERAPEUTIC EXERCISES: CPT | Mod: PN

## 2020-07-24 NOTE — PROGRESS NOTES
"  Occupational Therapy Daily Treatment Note     Date: 7/17/2020  Name: Joan Josue  Clinic Number: [id number]    Therapy Diagnosis:   Encounter Diagnoses   Name Primary?    Shoulder stiffness, left Yes    Left arm weakness     Stiffness of left hand joint     Pain in joint of left hand     Effusion of joint of left hand     Left hand weakness     Left shoulder pain, unspecified chronicity      Physician: Renzo Plata II, MD    Physician Orders: evaluate and tx  Medical Diagnosis: left distal radius fx, proximal humeral fx  Surgical Procedure and Date: left distal radius fx ORIF, 4-9-20  Evaluation Date: 6/1/2020  Insurance Authorization Period Expiration:referral# [id number] 5-29-20 thru 7-31-20  Plan of Care Certification Period: 7-17-20 thru 8-14-20  Date of Return to MD: see epic    Visit # / Visits authorized: 11 / 20 referral # [id number] 5-29-20 thru 7-31-20  Time In: 930  Time Out: 1015  Total Billable Time: 45 minutes    Precautions:  Universal, see epic    Subjective     Pt reports: no new issues, she is compliant with home exercise program.   Response to previous treatment: good  Functional change: light use continues to improve especially washing hair   Pain: 0/10 rest; 3/10 light use  Location: diffuse thru left arm      ache  Objective       Timed units:  3 therapeutic exercise                            Time:945-1030      Measurements:                                      r prom er at 0 abd 80   at 45 abd 90    at 90 abd 80;       sidelying ir  60        ir behind back 14"    lift off  l prom er at 0 abd 70    at 45 abd 80    at  90 abd 72;      sidelying ir 52         ir behind back 14"   lift off    Prom                      Right           Left   Df/pf                      80/90          60/64  Rd/ud                    20/40         20/40    Fluido: n/a  U/s:n/a      UBE: n/a    Scar massage: n/a     Stretches as tolerated-pain free: er 0, 45, 90 abd; sleeper;  df/pf      Manual: " n/a    ROM: n/a    PRE: n/a      HEP: see below            Home Exercises and Education Provided     Education provided:           progress towards goals   likely tx progression  rationale of rehab interventions    Written Home Exercises Provided: er 90 abd stretch  Previously issued exercises were reviewed if still part of current tx plan as well as any issued today and Joan was able to demonstrate them prior to the end of the session.  Joan demonstrated good  understanding of the HEP provided.     See EMR under patient instructions and/or media for HEP issued today or in past        Assessment     Light functional use and movement patterns continue to improve      Pt would continue to benefit from skilled OT in order to facilitate full functional use left arm and proper mechanics    Joan is progressing well towards her goals and there are no updates to goals at this time. Pt prognosis is good  Pt will continue to benefit from skilled outpatient occupational therapy to address the deficits listed in the problem list on initial evaluation to provide pt/family education and to maximize pt's level of independence in the home and community environment.     Anticipated barriers to occupational therapy: edema, pain, stiffness, weakness, what appears to be attention and memory deficits    Pt's spiritual, cultural and educational needs considered and pt agreeable to plan of care and goals.    Goals:  stg to be met in 3 weeks 1. Patient will be I with HEP 2. Patient will have 2/10 pain with light use 3. Patient will have = prom of bilateral arms to enhance affected arm use with ADL        ltg to be met by d/c 1. Patient will be I with d/c HEP 2. Patient will have 1/10 pain with all use 3. Patient will have about 75% /pinch  on affected side vs unaffected side to promote full functional use  goal                                                                     4. Patient will be I with all ADL  5. Patient  will have about 3+/5 MMT elevation left arm to improve use with ADL     all goals ongoing unless otherwise noted              Any goals met today ?  (if any met see above)    Updates/Grading for next session: prn    Plan: edema control, scar management, ROM, PRE, patient education, manual tx, prn tx      Donovan Bryant, OT /CHT

## 2020-07-31 ENCOUNTER — CLINICAL SUPPORT (OUTPATIENT)
Dept: REHABILITATION | Facility: HOSPITAL | Age: 80
End: 2020-07-31
Attending: ORTHOPAEDIC SURGERY
Payer: MEDICARE

## 2020-07-31 DIAGNOSIS — M25.612 SHOULDER STIFFNESS, LEFT: Primary | ICD-10-CM

## 2020-07-31 DIAGNOSIS — M25.512 LEFT SHOULDER PAIN, UNSPECIFIED CHRONICITY: ICD-10-CM

## 2020-07-31 DIAGNOSIS — R29.898 LEFT HAND WEAKNESS: ICD-10-CM

## 2020-07-31 DIAGNOSIS — R29.898 LEFT ARM WEAKNESS: ICD-10-CM

## 2020-07-31 DIAGNOSIS — M25.442 EFFUSION OF JOINT OF LEFT HAND: ICD-10-CM

## 2020-07-31 DIAGNOSIS — M25.642 STIFFNESS OF LEFT HAND JOINT: ICD-10-CM

## 2020-07-31 DIAGNOSIS — M25.542 PAIN IN JOINT OF LEFT HAND: ICD-10-CM

## 2020-07-31 PROCEDURE — 97110 THERAPEUTIC EXERCISES: CPT | Mod: PN

## 2020-07-31 NOTE — PATIENT INSTRUCTIONS
current home program 7-31-20  -add this  -gently smear hand through yellow putty 2 sets of 25 every day

## 2020-07-31 NOTE — PROGRESS NOTES
Occupational Therapy Daily Treatment Note     Date: 7/17/2020  Name: Joan Josue  Clinic Number: [id number]    Therapy Diagnosis:   Encounter Diagnoses   Name Primary?    Shoulder stiffness, left Yes    Left arm weakness     Stiffness of left hand joint     Pain in joint of left hand     Effusion of joint of left hand     Left hand weakness     Left shoulder pain, unspecified chronicity      Physician: Renzo Plata II, MD    Physician Orders: evaluate and tx  Medical Diagnosis: left distal radius fx, proximal humeral fx  Surgical Procedure and Date: left distal radius fx ORIF, 4-9-20  Evaluation Date: 6/1/2020  Insurance Authorization Period Expiration:referral# [id number] 5-29-20 thru 7-31-20  Plan of Care Certification Period: 7-17-20 thru 8-14-20  Date of Return to MD: see epic    Visit # / Visits authorized: 12 / 20 referral # [id number] 5-29-20 thru 7-31-20  Time In: 945  Time Out: 1030  Total Billable Time: 45 minutes    Precautions:  Universal, see epic    Subjective     Pt reports: no new issues, she is compliant with home exercise program.   Response to previous treatment: good  Functional change: light use continues to improve especially holding light items with home care  Pain: 0/10 rest; 3/10 light use  Location: diffuse thru left arm      ache  Objective       Timed units:  3 therapeutic exercise                            Time:945-1030      Measurements:                                      n/a    Fluido: n/a  U/s:n/a      UBE: n/a    Scar massage: n/a     Stretches as tolerated-pain free: er 0, 45, 90 abd; sleeper;  Df/pf; yellow putty smears      Manual: n/a    ROM: n/a    PRE: n/a      HEP: see below            Home Exercises and Education Provided     Education provided:           progress towards goals   likely tx progression  rationale of rehab interventions    Written Home Exercises Provided: see above  Previously issued exercises were reviewed if still part of current tx plan as  well as any issued today and Joan was able to demonstrate them prior to the end of the session.  Joan demonstrated good  understanding of the HEP provided.     See EMR under patient instructions and/or media for HEP issued today or in past        Assessment     Mild tension with pip ext end range noted at if thru sf      Pt would continue to benefit from skilled OT in order to facilitate full functional use left arm and proper mechanics    Joan is progressing well towards her goals and there are no updates to goals at this time. Pt prognosis is good  Pt will continue to benefit from skilled outpatient occupational therapy to address the deficits listed in the problem list on initial evaluation to provide pt/family education and to maximize pt's level of independence in the home and community environment.     Anticipated barriers to occupational therapy: edema, pain, stiffness, weakness, what appears to be attention and memory deficits    Pt's spiritual, cultural and educational needs considered and pt agreeable to plan of care and goals.    Goals:  stg to be met in 3 weeks 1. Patient will be I with HEP 2. Patient will have 2/10 pain with light use 3. Patient will have = prom of bilateral arms to enhance affected arm use with ADL        ltg to be met by d/c 1. Patient will be I with d/c HEP 2. Patient will have 1/10 pain with all use 3. Patient will have about 75% /pinch  on affected side vs unaffected side to promote full functional use  goal                                                                     4. Patient will be I with all ADL  5. Patient will have about 3+/5 MMT elevation left arm to improve use with ADL     all goals ongoing unless otherwise noted              Any goals met today ?  (if any met see above)    Updates/Grading for next session: prn    Plan: edema control, scar management, ROM, PRE, patient education, manual tx, prn tx      Donovan Bryant, OT /CHT

## 2020-08-07 ENCOUNTER — CLINICAL SUPPORT (OUTPATIENT)
Dept: REHABILITATION | Facility: HOSPITAL | Age: 80
End: 2020-08-07
Attending: ORTHOPAEDIC SURGERY
Payer: MEDICARE

## 2020-08-07 DIAGNOSIS — M25.642 STIFFNESS OF LEFT HAND JOINT: ICD-10-CM

## 2020-08-07 DIAGNOSIS — M25.512 LEFT SHOULDER PAIN, UNSPECIFIED CHRONICITY: ICD-10-CM

## 2020-08-07 DIAGNOSIS — R29.898 LEFT HAND WEAKNESS: ICD-10-CM

## 2020-08-07 DIAGNOSIS — M25.612 SHOULDER STIFFNESS, LEFT: Primary | ICD-10-CM

## 2020-08-07 DIAGNOSIS — R29.898 LEFT ARM WEAKNESS: ICD-10-CM

## 2020-08-07 DIAGNOSIS — M25.442 EFFUSION OF JOINT OF LEFT HAND: ICD-10-CM

## 2020-08-07 DIAGNOSIS — M25.542 PAIN IN JOINT OF LEFT HAND: ICD-10-CM

## 2020-08-07 PROCEDURE — 97110 THERAPEUTIC EXERCISES: CPT | Mod: PN

## 2020-08-07 NOTE — PROGRESS NOTES
Occupational Therapy Daily Treatment Note     Date: 7/17/2020  Name: Joan Josue  Clinic Number: [id number]    Therapy Diagnosis:   Encounter Diagnoses   Name Primary?    Shoulder stiffness, left Yes    Left arm weakness     Stiffness of left hand joint     Pain in joint of left hand     Effusion of joint of left hand     Left hand weakness     Left shoulder pain, unspecified chronicity      Physician: Renzo Plata II, MD    Physician Orders: evaluate and tx  Medical Diagnosis: left distal radius fx, proximal humeral fx  Surgical Procedure and Date: left distal radius fx ORIF, 4-9-20  Evaluation Date: 6/1/2020  Insurance Authorization Period Expiration: see epic  Plan of Care Certification Period: 7-17-20 thru 8-14-20  Date of Return to MD: see epic    Visit # / Visits authorized: see epic  Time In: 945  Time Out: 1030  Total Billable Time: 45 minutes    Precautions:  Universal, see epic    Subjective     Pt reports: no new issues, she is compliant with home exercise program.   Response to previous treatment: good  Functional change: able to  cat with no problems now  Pain: 0/10 rest; 3/10 light use  Location: diffuse thru left arm      ache  Objective       Timed units:  3 therapeutic exercise                            Time:945-1030      Measurements:                                      full prom er x 3 and ir x 2 for left shoulder although mild tension felt at end range for er 90 abd    Fluido: n/a  U/s:n/a      UBE: n/a    Scar massage: n/a     Stretches as tolerated-pain free: er 0 90 abd    Manual: n/a    ROM: n/a    PRE: isometrics- add, mid row, er 0 abd      HEP: see below            Home Exercises and Education Provided     Education provided:     Likely timing for stretches overhead        progress towards goals   likely tx progression  rationale of rehab interventions    Written Home Exercises Provided: see above  Previously issued exercises were reviewed if still part of current  tx plan as well as any issued today and Joan was able to demonstrate them prior to the end of the session.  Joan demonstrated good  understanding of the HEP provided.     See EMR under patient instructions and/or media for HEP issued today or in past        Assessment     Considering extent of stiffness noted earlier in rehab course, progress has been excellent; resolving stiffness important for full use and to discourage soft tissue overuse and compression long term    Light use over shoulder level with good increase since 4 weeks ago, wrist still with some tightness limiting some use with home tasks          Pt would continue to benefit from skilled OT in order to facilitate full functional use left arm and proper mechanics    Joan is progressing well towards her goals and there are no updates to goals at this time. Pt prognosis is good  Pt will continue to benefit from skilled outpatient occupational therapy to address the deficits listed in the problem list on initial evaluation to provide pt/family education and to maximize pt's level of independence in the home and community environment.     Anticipated barriers to occupational therapy: edema, pain, stiffness, weakness, what appears to be attention and memory deficits    Pt's spiritual, cultural and educational needs considered and pt agreeable to plan of care and goals.    Goals:  stg to be met in 3 weeks 1. Patient will be I with HEP 2. Patient will have 2/10 pain with light use 3. Patient will have = prom of bilateral arms to enhance affected arm use with ADL        ltg to be met by d/c 1. Patient will be I with d/c HEP 2. Patient will have 1/10 pain with all use 3. Patient will have about 75% /pinch  on affected side vs unaffected side to promote full functional use  goal                                                                     4. Patient will be I with all ADL  5. Patient will have about 3+/5 MMT elevation left arm to improve  use with ADL     all goals ongoing unless otherwise noted              Any goals met today ?  (if any met see above)    Updates/Grading for next session: prn, consider prom testing elbow thru hand    Plan: edema control, scar management, ROM, PRE, patient education, manual tx, prn tx      Donovan Bryant, OT /CHT

## 2020-08-14 ENCOUNTER — CLINICAL SUPPORT (OUTPATIENT)
Dept: REHABILITATION | Facility: HOSPITAL | Age: 80
End: 2020-08-14
Attending: ORTHOPAEDIC SURGERY
Payer: MEDICARE

## 2020-08-14 DIAGNOSIS — M25.542 PAIN IN JOINT OF LEFT HAND: ICD-10-CM

## 2020-08-14 DIAGNOSIS — M25.512 LEFT SHOULDER PAIN, UNSPECIFIED CHRONICITY: ICD-10-CM

## 2020-08-14 DIAGNOSIS — M25.612 SHOULDER STIFFNESS, LEFT: Primary | ICD-10-CM

## 2020-08-14 DIAGNOSIS — R29.898 LEFT HAND WEAKNESS: ICD-10-CM

## 2020-08-14 DIAGNOSIS — M25.642 STIFFNESS OF LEFT HAND JOINT: ICD-10-CM

## 2020-08-14 DIAGNOSIS — R29.898 LEFT ARM WEAKNESS: ICD-10-CM

## 2020-08-14 DIAGNOSIS — M25.442 EFFUSION OF JOINT OF LEFT HAND: ICD-10-CM

## 2020-08-14 PROCEDURE — 97110 THERAPEUTIC EXERCISES: CPT | Mod: PN

## 2020-08-14 NOTE — PROGRESS NOTES
Occupational Therapy Daily Treatment Note     Date: 7/17/2020  Name: Joan Josue  Clinic Number: [id number]    Therapy Diagnosis:   Encounter Diagnoses   Name Primary?    Shoulder stiffness, left Yes    Left arm weakness     Stiffness of left hand joint     Pain in joint of left hand     Effusion of joint of left hand     Left hand weakness     Left shoulder pain, unspecified chronicity      Physician: Renzo Plata II, MD    Physician Orders: evaluate and tx  Medical Diagnosis: left distal radius fx, proximal humeral fx  Surgical Procedure and Date: left distal radius fx ORIF, 4-9-20  Evaluation Date: 6/1/2020  Insurance Authorization Period Expiration: see epic  Plan of Care Certification Period: 7-17-20 thru 8-14-20  Date of Return to MD: see epic    Visit # / Visits authorized: see epic  Time In: 945  Time Out: 1030  Total Billable Time: 45 minutes    Precautions:  Universal, see epic    Subjective     Pt reports: no new issues  Response to previous treatment: good  Functional change: overall use with home and self care with good increase since 4 weeks ago  Pain: 0/10 rest; 3/10 light use  Location: diffuse thru left arm      ache  Objective       Timed units:  3 therapeutic exercise                            Time:945-1030      Measurements:                                      Left mcp 2 thru 5 19.5 cm    Fluido: n/a  U/s:n/a      UBE: n/a    Scar massage: n/a     Stretches as tolerated-pain free: sup, df/pf; er 45 abd, 90 abd    Manual: n/a    ROM: n/a    PRE: n/a      HEP: see below            Home Exercises and Education Provided     Education provided:     Likely timing for d/c, told her to stop using flat cardboard like material strapped to index thru small she started using on her own        progress towards goals   likely tx progression  rationale of rehab interventions    Written Home Exercises Provided: how to order a left medium open finger isotoner therapeutic glove (explained to her  daughter marilia)  Previously issued exercises were reviewed if still part of current tx plan as well as any issued today and Joan was able to demonstrate them prior to the end of the session.  Joan demonstrated good  understanding of the HEP provided.     See EMR under patient instructions and/or media for HEP issued today or in past        Assessment     Fixing remaining stiffness should decrease limited functional use (full elevation with ADL not possible plus maneuvering wrist with some self care still limited)          Pt would continue to benefit from skilled OT in order to facilitate full functional use left arm and proper mechanics    Joan is progressing well towards her goals and there are no updates to goals at this time. Pt prognosis is good  Pt will continue to benefit from skilled outpatient occupational therapy to address the deficits listed in the problem list on initial evaluation to provide pt/family education and to maximize pt's level of independence in the home and community environment.     Anticipated barriers to occupational therapy: edema, pain, stiffness, weakness, what appears to be attention and memory deficits    Pt's spiritual, cultural and educational needs considered and pt agreeable to plan of care and goals.    Goals:  stg to be met in 3 weeks 1. Patient will be I with HEP 2. Patient will have 2/10 pain with light use 3. Patient will have = prom of bilateral arms to enhance affected arm use with ADL        ltg to be met by d/c 1. Patient will be I with d/c HEP 2. Patient will have 1/10 pain with all use 3. Patient will have about 75% /pinch  on affected side vs unaffected side to promote full functional use  goal                                                                     4. Patient will be I with all ADL  5. Patient will have about 3+/5 MMT elevation left arm to improve use with ADL     all goals ongoing unless otherwise noted              Any goals met today  ?  (if any met see above)    Updates/Grading for next session: prn    Plan: edema control, scar management, ROM, PRE, patient education, manual tx, prn tx      Donovan Bryant, OT /CHT

## 2020-09-03 ENCOUNTER — TELEPHONE (OUTPATIENT)
Dept: REHABILITATION | Facility: HOSPITAL | Age: 80
End: 2020-09-03

## 2020-10-02 ENCOUNTER — CLINICAL SUPPORT (OUTPATIENT)
Dept: REHABILITATION | Facility: HOSPITAL | Age: 80
End: 2020-10-02
Attending: ORTHOPAEDIC SURGERY
Payer: MEDICARE

## 2020-10-02 DIAGNOSIS — M25.612 SHOULDER STIFFNESS, LEFT: Primary | ICD-10-CM

## 2020-10-02 DIAGNOSIS — R29.898 LEFT HAND WEAKNESS: ICD-10-CM

## 2020-10-02 DIAGNOSIS — M25.442 EFFUSION OF JOINT OF LEFT HAND: ICD-10-CM

## 2020-10-02 DIAGNOSIS — R29.898 LEFT ARM WEAKNESS: ICD-10-CM

## 2020-10-02 DIAGNOSIS — M25.642 STIFFNESS OF LEFT HAND JOINT: ICD-10-CM

## 2020-10-02 DIAGNOSIS — M25.512 LEFT SHOULDER PAIN, UNSPECIFIED CHRONICITY: ICD-10-CM

## 2020-10-02 DIAGNOSIS — M25.542 PAIN IN JOINT OF LEFT HAND: ICD-10-CM

## 2020-10-02 PROCEDURE — 97110 THERAPEUTIC EXERCISES: CPT | Mod: PN

## 2020-10-02 NOTE — PLAN OF CARE
Outpatient Therapy Updated Plan of Care     Visit Date: 10/2/2020  Name: Joan Josue  Clinic Number: [id number]    Therapy Diagnosis:   Encounter Diagnoses   Name Primary?    Shoulder stiffness, left Yes    Left arm weakness     Stiffness of left hand joint     Pain in joint of left hand     Effusion of joint of left hand     Left shoulder pain, unspecified chronicity     Left hand weakness      Physician: Renzo Plata II, MD    Physician Orders: evaluate and tx  Medical Diagnosis: see evaluation  Evaluation Date: 6-1-20    Total Visits Received: 15  Cancelled Visits: see epic  No Show Visits: see epic    Current Certification Period:  10-2-20 to 10-30-20  Precautions:  universal  Visits from Evaluation Date:  15  Functional Level Prior to Evaluation:  Decreased rom, use, and strength; pre symptom onset had no deficits with functional use    Subjective     Update: doing all required tasks; no pain at rest, with use, or during sleep    Objective     Update: see prior notes for measurements    Assessment     Update: excellent decrease in stiffness since 1st day of OT    Previous Short Term Goals Status:   See today's note  New Short Term Goals Status:   n/a  Long Term Goal Status:   continue per initial plan of care.  Continue any LTGs added in notes post initial eval  Reasons for Recertification of Therapy:   Transition to d/c HEP    Plan     Updated Certification Period: 10/2/2020 to 10-30-20  Recommended Treatment Plan: today only  Other Recommendations: n/a    Donovan Bryant OT/CHT  10/2/2020    frequency per week may change based on patient progress and need for therapy

## 2020-10-02 NOTE — PATIENT INSTRUCTIONS
current home program 10-2-20  -slowly progress use as tolerated-pain free  -do all stretches issued today for at least 6 weeks  -do below stretch one time, 2 x day, at least a 10 minute hold, mild discomfort only  *on back put hands behind head and let gravity stretch your left elbow down

## 2020-10-02 NOTE — PROGRESS NOTES
"  Occupational Therapy Daily Treatment Note     Date: 10-2-20  Name: Joan Josue  Clinic Number: [id number]    Therapy Diagnosis:   Encounter Diagnoses   Name Primary?    Shoulder stiffness, left Yes    Left arm weakness     Stiffness of left hand joint     Pain in joint of left hand     Effusion of joint of left hand     Left hand weakness     Left shoulder pain, unspecified chronicity      Physician: Renzo Plata II, MD    Physician Orders: evaluate and tx  Medical Diagnosis: left distal radius fx, proximal humeral fx  Surgical Procedure and Date: left distal radius fx ORIF, 4-9-20  Evaluation Date: 6/1/2020  Insurance Authorization Period Expiration: referral # [id number] 9-16-20 thru 10-30-20  Plan of Care Certification Period: 10-2-20 thru 10-30-20  Date of Return to MD: see epic    Visit # / Visits authorized: see epic  Time In: 950  Time Out: 1020  Total Billable Time: 30 minutes    Precautions:  Universal, see epic    Subjective     Pt reports: no new issues  Response to previous treatment: good  Functional change: doing all required tasks  Pain: 0/10 rest; 0/10 all use  Location: n/a  Objective       Timed units:  2 therapeutic exercise                            Time:950-1020      Measurements:                                      Full prom shoulder thru hand except;    d2 on right 0" elbow to table, on left 2" elbow to table  (flex not tested on left)    Supine hands behind head on right 0" elbow to table, on left 2" elbow to table    Wrist pf on right 80 vs on left 70    Fluido: n/a  U/s:n/a      UBE: n/a    Scar massage: n/a     Stretches as tolerated-pain free: d2, supine hands behind head, pf    Manual: n/a    ROM: n/a    PRE: n/a      HEP: see below            Home Exercises and Education Provided     Education provided:     Explained continued clinic OT not indicated    Written Home Exercises Provided: see media and above  Previously issued exercises were reviewed if still part of " current tx plan as well as any issued today and Joan was able to demonstrate them prior to the end of the session.  Joan demonstrated good  understanding of the HEP provided.     See EMR under patient instructions and/or media for HEP issued today or in past        Assessment       Outstanding decrease and stiffness and increase in functional use since day 1 of OT      Pt's spiritual, cultural and educational needs considered and pt agreeable to plan of care and goals.    Goals:  stg to be met in 3 weeks 1. Patient will be I with HEP 2. Patient will have 2/10 pain with light use 3. Patient will have = prom of bilateral arms to enhance affected arm use with ADL        ltg to be met by d/c 1. Patient will be I with d/c HEP 2. Patient will have 1/10 pain with all use 3. Patient will have about 75% /pinch on affected side vs unaffected side to promote full functional use  goal                                                                     4. Patient will be I with all ADL  5. Patient will have about 3+/5 MMT elevation left arm to improve use with ADL     all goals ongoing unless otherwise noted    All goals met except stg 3 and ltg 3 and              Plan: d/c      Donovan Bryant OT /CHT          Outpatient Therapy Discharge Summary     Date: 10-20-20      Name: Joan Josue  [facility] Number: [id number]    Therapy Diagnosis:   Encounter Diagnoses   Name Primary?    Shoulder stiffness, left Yes    Left arm weakness     Stiffness of left hand joint     Pain in joint of left hand     Effusion of joint of left hand     Left shoulder pain, unspecified chronicity     Left hand weakness      Physician: Renzo Plata II, MD    Physician Orders: evaluate and tx, see epic  Medical Diagnosis: see evaluation  Evaluation Date: 6-1-20      Date of Last visit: 10-2-20  Total Visits Received: 15  Cancelled Visits: see epic  No Show Visits: see epic    Assessment    Goals: see today's note    Discharge  reason: Patient has reached the maximum rehab potential for the present time    Plan   This patient is discharged from Occupational Therapy

## 2022-06-29 NOTE — INTERVAL H&P NOTE
Problem: Perioperative Period (Adult)  Goal: Signs and Symptoms of Listed Potential Problems Will be Absent or Manageable (Perioperative Period)  Outcome: Ongoing (interventions implemented as appropriate)      
The patient has been examined and the H&P has been reviewed:    I concur with the findings and no changes have occurred since H&P was written.    Anesthesia/Surgery risks, benefits and alternative options discussed and understood by patient/family.          Active Hospital Problems    Diagnosis  POA    Closed fracture of left distal radius [S56.119L]  Yes      Resolved Hospital Problems   No resolved problems to display.     
no

## (undated) DEVICE — SEE MEDLINE ITEM 157116

## (undated) DEVICE — SUT 2/0 18IN COATED VICRYL

## (undated) DEVICE — SEE MEDLINE ITEM 146292

## (undated) DEVICE — GLOVE SURG ULTRA TOUCH 7.5

## (undated) DEVICE — CHLORAPREP 3ML APPLICATOR TINT

## (undated) DEVICE — PACK BASIC

## (undated) DEVICE — ELECTRODE ELECSURG NDL

## (undated) DEVICE — UNDERGLOVES BIOGEL PI SIZE 8

## (undated) DEVICE — BANDAGE ESMARK 6X12

## (undated) DEVICE — GAUZE SPONGE 8X4 12 PLY

## (undated) DEVICE — UNDERGLOVES BIOGEL PI SIZE 7.5

## (undated) DEVICE — DRAPE PLASTIC U 60X72

## (undated) DEVICE — BIT DRILL 2.0.

## (undated) DEVICE — ALCOHOL 70% ISOP RUBBING 4OZ

## (undated) DEVICE — BIT DRILL QC 1.8X110MM

## (undated) DEVICE — NDL SAFETY 21G X 1 1/2 ECLPSE

## (undated) DEVICE — PADDING CAST 4IN SPECIALIST

## (undated) DEVICE — ELECTRODE REM PLYHSV RETURN 9

## (undated) DEVICE — TOURNIQUET SB QC DP 18X4IN

## (undated) DEVICE — PACK CUSTOM UNIV BASIN SLI

## (undated) DEVICE — GLOVE SURG ULTRA TOUCH 8

## (undated) DEVICE — SEE MEDLINE ITEM 157131

## (undated) DEVICE — SEE MEDLINE ITEM 146270

## (undated) DEVICE — SEE MEDLINE ITEM 152622

## (undated) DEVICE — BLADE SURG #15 CARBON STEEL

## (undated) DEVICE — DRAPE C ARM 42 X 120 10/BX

## (undated) DEVICE — LINER SUCTION 3000CC

## (undated) DEVICE — DRAPE STERI U-SHAPED 47X51IN

## (undated) DEVICE — GOWN B1 X-LG X-LONG

## (undated) DEVICE — STRAP OR TABLE 5IN X 72IN

## (undated) DEVICE — SEE MEDLINE ITEM 157117

## (undated) DEVICE — SEE MEDLINE ITEM 146345

## (undated) DEVICE — DRESSING DERMACEA SPNG 10S

## (undated) DEVICE — SEE MEDLINE ITEM 157171

## (undated) DEVICE — SYR 10CC LUER LOCK

## (undated) DEVICE — SOL 9P NACL IRR PIC IL

## (undated) DEVICE — SUT ETHICON 3-0 BLK MONO PS